# Patient Record
Sex: MALE | Race: WHITE | Employment: OTHER | URBAN - METROPOLITAN AREA
[De-identification: names, ages, dates, MRNs, and addresses within clinical notes are randomized per-mention and may not be internally consistent; named-entity substitution may affect disease eponyms.]

---

## 2022-05-07 ENCOUNTER — HOSPITAL ENCOUNTER (INPATIENT)
Facility: HOSPITAL | Age: 67
LOS: 4 days | Discharge: HOME | End: 2022-05-11
Attending: EMERGENCY MEDICINE | Admitting: HOSPITALIST
Payer: MEDICARE

## 2022-05-07 ENCOUNTER — APPOINTMENT (OUTPATIENT)
Dept: GENERAL RADIOLOGY | Facility: HOSPITAL | Age: 67
End: 2022-05-07
Attending: EMERGENCY MEDICINE
Payer: MEDICARE

## 2022-05-07 ENCOUNTER — APPOINTMENT (OUTPATIENT)
Dept: CT IMAGING | Facility: HOSPITAL | Age: 67
End: 2022-05-07
Attending: EMERGENCY MEDICINE
Payer: MEDICARE

## 2022-05-07 DIAGNOSIS — I10 HYPERTENSION, UNSPECIFIED TYPE: ICD-10-CM

## 2022-05-07 DIAGNOSIS — K56.609 SMALL BOWEL OBSTRUCTION (HCC): Primary | ICD-10-CM

## 2022-05-07 LAB
ALBUMIN SERPL-MCNC: 4.2 G/DL (ref 3.4–5)
ALP LIVER SERPL-CCNC: 60 U/L
ALT SERPL-CCNC: 19 U/L
ANION GAP SERPL CALC-SCNC: 9 MMOL/L (ref 0–18)
AST SERPL-CCNC: 20 U/L (ref 15–37)
BASOPHILS # BLD AUTO: 0.04 X10(3) UL (ref 0–0.2)
BASOPHILS NFR BLD AUTO: 0.4 %
BILIRUB DIRECT SERPL-MCNC: 0.2 MG/DL (ref 0–0.2)
BILIRUB SERPL-MCNC: 0.7 MG/DL (ref 0.1–2)
BILIRUB UR QL: NEGATIVE
BUN BLD-MCNC: 14 MG/DL (ref 7–18)
BUN/CREAT SERPL: 13.9 (ref 10–20)
CALCIUM BLD-MCNC: 9.5 MG/DL (ref 8.5–10.1)
CHLORIDE SERPL-SCNC: 104 MMOL/L (ref 98–112)
CLARITY UR: CLEAR
CO2 SERPL-SCNC: 26 MMOL/L (ref 21–32)
COLOR UR: YELLOW
CREAT BLD-MCNC: 1.01 MG/DL
DEPRECATED RDW RBC AUTO: 41.1 FL (ref 35.1–46.3)
EOSINOPHIL # BLD AUTO: 0.01 X10(3) UL (ref 0–0.7)
EOSINOPHIL NFR BLD AUTO: 0.1 %
ERYTHROCYTE [DISTWIDTH] IN BLOOD BY AUTOMATED COUNT: 12.5 % (ref 11–15)
GLUCOSE BLD-MCNC: 126 MG/DL (ref 70–99)
GLUCOSE UR-MCNC: NEGATIVE MG/DL
HCT VFR BLD AUTO: 40.9 %
HGB BLD-MCNC: 13.4 G/DL
HGB UR QL STRIP.AUTO: NEGATIVE
IMM GRANULOCYTES # BLD AUTO: 0.03 X10(3) UL (ref 0–1)
IMM GRANULOCYTES NFR BLD: 0.3 %
KETONES UR-MCNC: 20 MG/DL
LEUKOCYTE ESTERASE UR QL STRIP.AUTO: NEGATIVE
LYMPHOCYTES # BLD AUTO: 0.78 X10(3) UL (ref 1–4)
LYMPHOCYTES NFR BLD AUTO: 7.3 %
MAGNESIUM SERPL-MCNC: 1.6 MG/DL (ref 1.6–2.6)
MCH RBC QN AUTO: 29.3 PG (ref 26–34)
MCHC RBC AUTO-ENTMCNC: 32.8 G/DL (ref 31–37)
MCV RBC AUTO: 89.5 FL
MONOCYTES # BLD AUTO: 0.45 X10(3) UL (ref 0.1–1)
MONOCYTES NFR BLD AUTO: 4.2 %
NEUTROPHILS # BLD AUTO: 9.38 X10 (3) UL (ref 1.5–7.7)
NEUTROPHILS # BLD AUTO: 9.38 X10(3) UL (ref 1.5–7.7)
NEUTROPHILS NFR BLD AUTO: 87.7 %
NITRITE UR QL STRIP.AUTO: NEGATIVE
OSMOLALITY SERPL CALC.SUM OF ELEC: 290 MOSM/KG (ref 275–295)
PH UR: 7 [PH] (ref 5–8)
PLATELET # BLD AUTO: 321 10(3)UL (ref 150–450)
POTASSIUM SERPL-SCNC: 3.6 MMOL/L (ref 3.5–5.1)
PROT SERPL-MCNC: 8.5 G/DL (ref 6.4–8.2)
PROT UR-MCNC: 100 MG/DL
RBC # BLD AUTO: 4.57 X10(6)UL
SARS-COV-2 RNA RESP QL NAA+PROBE: NOT DETECTED
SODIUM SERPL-SCNC: 139 MMOL/L (ref 136–145)
SP GR UR STRIP: 1.02 (ref 1–1.03)
UROBILINOGEN UR STRIP-ACNC: <2
VIT C UR-MCNC: NEGATIVE MG/DL
WBC # BLD AUTO: 10.7 X10(3) UL (ref 4–11)

## 2022-05-07 PROCEDURE — 99222 1ST HOSP IP/OBS MODERATE 55: CPT | Performed by: HOSPITALIST

## 2022-05-07 PROCEDURE — 71045 X-RAY EXAM CHEST 1 VIEW: CPT | Performed by: EMERGENCY MEDICINE

## 2022-05-07 PROCEDURE — 74177 CT ABD & PELVIS W/CONTRAST: CPT | Performed by: EMERGENCY MEDICINE

## 2022-05-07 RX ORDER — HYDRALAZINE HYDROCHLORIDE 20 MG/ML
10 INJECTION INTRAMUSCULAR; INTRAVENOUS ONCE
Status: COMPLETED | OUTPATIENT
Start: 2022-05-07 | End: 2022-05-07

## 2022-05-07 RX ORDER — MORPHINE SULFATE 2 MG/ML
2 INJECTION, SOLUTION INTRAMUSCULAR; INTRAVENOUS EVERY 2 HOUR PRN
Status: DISCONTINUED | OUTPATIENT
Start: 2022-05-07 | End: 2022-05-11

## 2022-05-07 RX ORDER — MAGNESIUM SULFATE HEPTAHYDRATE 40 MG/ML
2 INJECTION, SOLUTION INTRAVENOUS ONCE
Status: COMPLETED | OUTPATIENT
Start: 2022-05-07 | End: 2022-05-08

## 2022-05-07 RX ORDER — LORAZEPAM 2 MG/ML
2 INJECTION INTRAMUSCULAR ONCE
Status: DISCONTINUED | OUTPATIENT
Start: 2022-05-07 | End: 2022-05-07

## 2022-05-07 RX ORDER — ONDANSETRON 2 MG/ML
4 INJECTION INTRAMUSCULAR; INTRAVENOUS EVERY 6 HOURS PRN
Status: DISCONTINUED | OUTPATIENT
Start: 2022-05-07 | End: 2022-05-11

## 2022-05-07 RX ORDER — MORPHINE SULFATE 4 MG/ML
4 INJECTION, SOLUTION INTRAMUSCULAR; INTRAVENOUS EVERY 2 HOUR PRN
Status: DISCONTINUED | OUTPATIENT
Start: 2022-05-07 | End: 2022-05-11

## 2022-05-07 RX ORDER — MORPHINE SULFATE 2 MG/ML
2 INJECTION, SOLUTION INTRAMUSCULAR; INTRAVENOUS ONCE
Status: COMPLETED | OUTPATIENT
Start: 2022-05-07 | End: 2022-05-07

## 2022-05-07 RX ORDER — LORAZEPAM 2 MG/ML
1 INJECTION INTRAMUSCULAR ONCE
Status: COMPLETED | OUTPATIENT
Start: 2022-05-07 | End: 2022-05-07

## 2022-05-07 RX ORDER — SODIUM CHLORIDE 9 MG/ML
INJECTION, SOLUTION INTRAVENOUS CONTINUOUS
Status: DISCONTINUED | OUTPATIENT
Start: 2022-05-07 | End: 2022-05-10

## 2022-05-07 RX ORDER — MORPHINE SULFATE 2 MG/ML
1 INJECTION, SOLUTION INTRAMUSCULAR; INTRAVENOUS EVERY 2 HOUR PRN
Status: DISCONTINUED | OUTPATIENT
Start: 2022-05-07 | End: 2022-05-11

## 2022-05-07 RX ORDER — MORPHINE SULFATE 4 MG/ML
4 INJECTION, SOLUTION INTRAMUSCULAR; INTRAVENOUS ONCE
Status: COMPLETED | OUTPATIENT
Start: 2022-05-07 | End: 2022-05-07

## 2022-05-07 NOTE — ED QUICK NOTES
Orders for admission, patient is aware of plan and ready to go upstairs. Any questions, please call ED RN jace at extension [phone].      Patient Covid vaccination status: Unvaccinated     COVID Test Ordered in ED: Rapid SARS-CoV-2 by PCR    COVID Suspicion at Admission: No risk with negative rapid    Running Infusions:  ns bolus    Mental Status/LOC at time of transport: a/ox4    Other pertinent information:  NG tube  CIWA score: N/A   NIH score:  N/A

## 2022-05-07 NOTE — ED INITIAL ASSESSMENT (HPI)
Right lower abdominal pain since this morning. No nausea. No chills/fever. Last drank pta.   Last ate at 10am.

## 2022-05-08 LAB
ANION GAP SERPL CALC-SCNC: 7 MMOL/L (ref 0–18)
BASOPHILS # BLD AUTO: 0.05 X10(3) UL (ref 0–0.2)
BASOPHILS NFR BLD AUTO: 0.6 %
BUN BLD-MCNC: 13 MG/DL (ref 7–18)
BUN/CREAT SERPL: 14 (ref 10–20)
CALCIUM BLD-MCNC: 8.6 MG/DL (ref 8.5–10.1)
CHLORIDE SERPL-SCNC: 105 MMOL/L (ref 98–112)
CO2 SERPL-SCNC: 26 MMOL/L (ref 21–32)
CREAT BLD-MCNC: 0.93 MG/DL
DEPRECATED RDW RBC AUTO: 42.8 FL (ref 35.1–46.3)
EOSINOPHIL # BLD AUTO: 0.13 X10(3) UL (ref 0–0.7)
EOSINOPHIL NFR BLD AUTO: 1.5 %
ERYTHROCYTE [DISTWIDTH] IN BLOOD BY AUTOMATED COUNT: 12.7 % (ref 11–15)
GLUCOSE BLD-MCNC: 109 MG/DL (ref 70–99)
HCT VFR BLD AUTO: 41.7 %
HGB BLD-MCNC: 13.6 G/DL
IMM GRANULOCYTES # BLD AUTO: 0.02 X10(3) UL (ref 0–1)
IMM GRANULOCYTES NFR BLD: 0.2 %
LYMPHOCYTES # BLD AUTO: 0.63 X10(3) UL (ref 1–4)
LYMPHOCYTES NFR BLD AUTO: 7.2 %
MAGNESIUM SERPL-MCNC: 2.5 MG/DL (ref 1.6–2.6)
MCH RBC QN AUTO: 29.8 PG (ref 26–34)
MCHC RBC AUTO-ENTMCNC: 32.6 G/DL (ref 31–37)
MCV RBC AUTO: 91.4 FL
MONOCYTES # BLD AUTO: 0.71 X10(3) UL (ref 0.1–1)
MONOCYTES NFR BLD AUTO: 8.1 %
NEUTROPHILS # BLD AUTO: 7.24 X10 (3) UL (ref 1.5–7.7)
NEUTROPHILS # BLD AUTO: 7.24 X10(3) UL (ref 1.5–7.7)
NEUTROPHILS NFR BLD AUTO: 82.4 %
OSMOLALITY SERPL CALC.SUM OF ELEC: 287 MOSM/KG (ref 275–295)
PLATELET # BLD AUTO: 301 10(3)UL (ref 150–450)
POTASSIUM SERPL-SCNC: 3.5 MMOL/L (ref 3.5–5.1)
RBC # BLD AUTO: 4.56 X10(6)UL
SODIUM SERPL-SCNC: 138 MMOL/L (ref 136–145)
WBC # BLD AUTO: 8.8 X10(3) UL (ref 4–11)

## 2022-05-08 PROCEDURE — 99223 1ST HOSP IP/OBS HIGH 75: CPT | Performed by: SURGERY

## 2022-05-08 PROCEDURE — 99233 SBSQ HOSP IP/OBS HIGH 50: CPT | Performed by: HOSPITALIST

## 2022-05-08 RX ORDER — HYDRALAZINE HYDROCHLORIDE 20 MG/ML
10 INJECTION INTRAMUSCULAR; INTRAVENOUS EVERY 4 HOURS PRN
Status: DISCONTINUED | OUTPATIENT
Start: 2022-05-08 | End: 2022-05-11

## 2022-05-08 RX ORDER — LABETALOL HYDROCHLORIDE 5 MG/ML
10 INJECTION, SOLUTION INTRAVENOUS EVERY 4 HOURS PRN
Status: DISCONTINUED | OUTPATIENT
Start: 2022-05-08 | End: 2022-05-11

## 2022-05-08 NOTE — ED QUICK NOTES
Dr. Philipp Marques aware of updated blood pressure. Patient okay to be admitted to floor with current blood pressure per MD. MD also aware of patient c/o worsening abdominal pain. Verbal order for morphine 2mg IVP stat.

## 2022-05-08 NOTE — PLAN OF CARE
Patient admitted to the ED with c/o RLQ abdominal pain, arrived on the unit with NG tube to R nare on LIS, patient is A/ox4 on RA, NPO, IVF infusing, replaced magnesium per non-cardiac electrolyte protocol, voiding freely, x1 episode of emesis, patient states he is able to tolerate pain. Vital signs are stable, no acute changes overnight. Patient orientated to unit, call light within reach, safety measures in place, frequent rounding done, will continue to monitor.

## 2022-05-09 PROCEDURE — 99233 SBSQ HOSP IP/OBS HIGH 50: CPT | Performed by: HOSPITALIST

## 2022-05-09 PROCEDURE — 99232 SBSQ HOSP IP/OBS MODERATE 35: CPT | Performed by: SURGERY

## 2022-05-09 NOTE — CM/SW NOTE
BPCI-Advanced Medicare Program Note:  Plan of care reviewed for care coordination and discharge planning. Noted patient falls under  BPCI/Medicare program, with  for gastrointestinal obstruction. NERY tool was used to help determine next care setting. Thus, [address] recommendations is for home pending patient progress. Case Management team is following for care coordination and discharge planning needs.

## 2022-05-10 LAB
ANION GAP SERPL CALC-SCNC: 7 MMOL/L (ref 0–18)
BASOPHILS # BLD AUTO: 0.05 X10(3) UL (ref 0–0.2)
BASOPHILS NFR BLD AUTO: 0.7 %
BUN BLD-MCNC: 18 MG/DL (ref 7–18)
BUN/CREAT SERPL: 22 (ref 10–20)
CALCIUM BLD-MCNC: 8.1 MG/DL (ref 8.5–10.1)
CHLORIDE SERPL-SCNC: 105 MMOL/L (ref 98–112)
CO2 SERPL-SCNC: 30 MMOL/L (ref 21–32)
CREAT BLD-MCNC: 0.82 MG/DL
DEPRECATED RDW RBC AUTO: 43 FL (ref 35.1–46.3)
EOSINOPHIL # BLD AUTO: 0.34 X10(3) UL (ref 0–0.7)
EOSINOPHIL NFR BLD AUTO: 4.9 %
ERYTHROCYTE [DISTWIDTH] IN BLOOD BY AUTOMATED COUNT: 12.4 % (ref 11–15)
GLUCOSE BLD-MCNC: 90 MG/DL (ref 70–99)
HCT VFR BLD AUTO: 36.4 %
HGB BLD-MCNC: 11.7 G/DL
IMM GRANULOCYTES # BLD AUTO: 0.02 X10(3) UL (ref 0–1)
IMM GRANULOCYTES NFR BLD: 0.3 %
LYMPHOCYTES # BLD AUTO: 1.1 X10(3) UL (ref 1–4)
LYMPHOCYTES NFR BLD AUTO: 15.9 %
MCH RBC QN AUTO: 30.1 PG (ref 26–34)
MCHC RBC AUTO-ENTMCNC: 32.1 G/DL (ref 31–37)
MCV RBC AUTO: 93.6 FL
MONOCYTES # BLD AUTO: 0.68 X10(3) UL (ref 0.1–1)
MONOCYTES NFR BLD AUTO: 9.8 %
NEUTROPHILS # BLD AUTO: 4.72 X10 (3) UL (ref 1.5–7.7)
NEUTROPHILS # BLD AUTO: 4.72 X10(3) UL (ref 1.5–7.7)
NEUTROPHILS NFR BLD AUTO: 68.4 %
OSMOLALITY SERPL CALC.SUM OF ELEC: 295 MOSM/KG (ref 275–295)
PLATELET # BLD AUTO: 224 10(3)UL (ref 150–450)
POTASSIUM SERPL-SCNC: 3.6 MMOL/L (ref 3.5–5.1)
RBC # BLD AUTO: 3.89 X10(6)UL
SODIUM SERPL-SCNC: 142 MMOL/L (ref 136–145)
WBC # BLD AUTO: 6.9 X10(3) UL (ref 4–11)

## 2022-05-10 PROCEDURE — 99232 SBSQ HOSP IP/OBS MODERATE 35: CPT | Performed by: SURGERY

## 2022-05-10 RX ORDER — LISINOPRIL 40 MG/1
40 TABLET ORAL DAILY
Qty: 30 TABLET | Refills: 0 | Status: SHIPPED | OUTPATIENT
Start: 2022-05-10

## 2022-05-10 NOTE — CDS QUERY
How to answer this Query:  1.) Click \"Edit button\" on the toolbar.  2.) Type an \"X\" in the bracket for the diagnosis that applies. (You may also add additional clinical details as you feel necessary to substantiate your response). 3.) Finally click \"Sign\" to complete response. Thank You  Potential for Impaired Nutritional Status  DOCUMENTATION CLARIFICATION FORM  Dear Johnnie Chang information suggests potential for impaired nutritional status. For accurate ICD-10-CM code assignment to reflect severity of illness and risk of mortality,  PLEASE (X) ALL DIAGNOSES THAT APPLY TO A BMI OF        SELECTION BY PHYSICIAN ONLY      (x)  Moderate Protein-Calorie Malnutrition    ( )  Protein-Calorie Malnutrition      ( )  Other (please specify):       ( )  Unable to Determine (please comment)         Documentation (include date/source): [id number]   _  Clinical Nutrition Assessment: RECOMMENDATIONS TO MD: Advance diet as medically safe     Pt is at moderate nutrition risk. Pt meets moderate malnutrition criteria. CRITERIA FOR MALNUTRITION DIAGNOSIS: Criteria for non-severe malnutrition diagnosis: chronic illness related to wt loss 7.5% in 3 months, body fat mild depletion and muscle mass mild depletion.    NUTRITION RELATED PHYSICAL FINDINGS:    - Nutrition Focused Physical Exam (NFPE): mild depletion body fat orbital region, triceps region and fat overlying rib cage region and mild depletion muscle mass temple region, clavicle region, scapular region, shoulder region, thigh region and calf region,    - Fluid Accumulation: none  see RN documentation for details  - Skin Integrity: intact see RN documentation for details     NUTRITION DIAGNOSIS/PROBLEM:    Moderate Malnutrition related to Chronic - Physiological causes increasing nutrient needs due to illness or condition in the setting of Altered GI function d/t SBO as evidenced by NPO/Cl liq diet, 0 intake, limited access to food PTA with 15% significant wt loss in the past several months ( ~ past 3 months per pt) and Low BMI of 17.2 kg/m2. Other:SEE FULL RD EVAL FOR FURTHER DETAILS    If you have any questions, please contact Clinical :  Leonardo Torres RN at [phone]     Thank You!     THIS FORM IS A PERMANENT PART OF THE MEDICAL RECORD

## 2022-05-10 NOTE — PLAN OF CARE
Patient had first solid meal this evening, and is tolerating well. No complaints of pain. He is ambulating in the hallway often, and is steady on his feet. Plan for discharge tomorrow.

## 2022-05-11 VITALS
HEIGHT: 69.6 IN | TEMPERATURE: 98 F | RESPIRATION RATE: 18 BRPM | SYSTOLIC BLOOD PRESSURE: 121 MMHG | DIASTOLIC BLOOD PRESSURE: 73 MMHG | WEIGHT: 119 LBS | BODY MASS INDEX: 17.23 KG/M2 | OXYGEN SATURATION: 98 % | HEART RATE: 67 BPM

## 2022-05-11 PROCEDURE — 99239 HOSP IP/OBS DSCHRG MGMT >30: CPT | Performed by: HOSPITALIST

## 2022-05-11 PROCEDURE — 99232 SBSQ HOSP IP/OBS MODERATE 35: CPT | Performed by: SURGERY

## 2022-05-11 NOTE — CM/SW NOTE
SW met with patient at bedside, patient is in agreement to a [address] PLAN: [address] to home address. PCS DONE. RN to call [phone] to schedule. Ride is on willcall for today.      SUNNY Godinez, [facility]    [id number]

## 2022-05-12 ENCOUNTER — PATIENT OUTREACH (OUTPATIENT)
Dept: CASE MANAGEMENT | Age: 67
End: 2022-05-12

## 2022-05-12 NOTE — PROGRESS NOTES
Attempted to reach the patient to complete a [facility]-Hospital FU call. The patient's phone rang multiple times without an answer or option for voicemail. NCM will try again later.

## 2022-05-12 NOTE — PROGRESS NOTES
NCM attempted to contact patient for hospital follow up. Line rings with no option to leave a message.

## 2022-06-28 ENCOUNTER — OFFICE VISIT (OUTPATIENT)
Dept: SURGERY | Facility: CLINIC | Age: 67
End: 2022-06-28
Payer: MEDICARE

## 2022-06-28 VITALS — BODY MASS INDEX: 17 KG/M2 | WEIGHT: 119 LBS

## 2022-06-28 DIAGNOSIS — K56.609 SBO (SMALL BOWEL OBSTRUCTION) (HCC): Primary | ICD-10-CM

## 2022-06-28 PROCEDURE — 1111F DSCHRG MED/CURRENT MED MERGE: CPT | Performed by: SURGERY

## 2022-06-28 PROCEDURE — 99214 OFFICE O/P EST MOD 30 MIN: CPT | Performed by: SURGERY

## 2022-09-13 ENCOUNTER — LAB ENCOUNTER (OUTPATIENT)
Dept: LAB | Facility: HOSPITAL | Age: 67
End: 2022-09-13
Attending: NURSE PRACTITIONER
Payer: MEDICARE

## 2022-09-13 ENCOUNTER — OFFICE VISIT (OUTPATIENT)
Dept: GASTROENTEROLOGY | Facility: CLINIC | Age: 67
End: 2022-09-13
Payer: MEDICARE

## 2022-09-13 ENCOUNTER — TELEPHONE (OUTPATIENT)
Dept: GASTROENTEROLOGY | Facility: CLINIC | Age: 67
End: 2022-09-13

## 2022-09-13 VITALS
BODY MASS INDEX: 17.37 KG/M2 | WEIGHT: 120 LBS | HEIGHT: 69.6 IN | SYSTOLIC BLOOD PRESSURE: 182 MMHG | DIASTOLIC BLOOD PRESSURE: 100 MMHG | HEART RATE: 103 BPM

## 2022-09-13 DIAGNOSIS — I10 PRIMARY HYPERTENSION: ICD-10-CM

## 2022-09-13 DIAGNOSIS — K56.609 SMALL BOWEL OBSTRUCTION (HCC): Primary | ICD-10-CM

## 2022-09-13 DIAGNOSIS — K56.609 SMALL BOWEL OBSTRUCTION (HCC): ICD-10-CM

## 2022-09-13 DIAGNOSIS — K59.00 CONSTIPATION, UNSPECIFIED CONSTIPATION TYPE: ICD-10-CM

## 2022-09-13 LAB
BASOPHILS # BLD AUTO: 0.06 X10(3) UL (ref 0–0.2)
BASOPHILS NFR BLD AUTO: 0.8 %
DEPRECATED RDW RBC AUTO: 42.5 FL (ref 35.1–46.3)
EOSINOPHIL # BLD AUTO: 0.04 X10(3) UL (ref 0–0.7)
EOSINOPHIL NFR BLD AUTO: 0.6 %
ERYTHROCYTE [DISTWIDTH] IN BLOOD BY AUTOMATED COUNT: 12.3 % (ref 11–15)
HCT VFR BLD AUTO: 41.2 %
HGB BLD-MCNC: 13.6 G/DL
IMM GRANULOCYTES # BLD AUTO: 0.02 X10(3) UL (ref 0–1)
IMM GRANULOCYTES NFR BLD: 0.3 %
LYMPHOCYTES # BLD AUTO: 1.07 X10(3) UL (ref 1–4)
LYMPHOCYTES NFR BLD AUTO: 15 %
MCH RBC QN AUTO: 30.8 PG (ref 26–34)
MCHC RBC AUTO-ENTMCNC: 33 G/DL (ref 31–37)
MCV RBC AUTO: 93.2 FL
MONOCYTES # BLD AUTO: 0.55 X10(3) UL (ref 0.1–1)
MONOCYTES NFR BLD AUTO: 7.7 %
NEUTROPHILS # BLD AUTO: 5.38 X10 (3) UL (ref 1.5–7.7)
NEUTROPHILS # BLD AUTO: 5.38 X10(3) UL (ref 1.5–7.7)
NEUTROPHILS NFR BLD AUTO: 75.6 %
PLATELET # BLD AUTO: 296 10(3)UL (ref 150–450)
RBC # BLD AUTO: 4.42 X10(6)UL
WBC # BLD AUTO: 7.1 X10(3) UL (ref 4–11)

## 2022-09-13 PROCEDURE — 99204 OFFICE O/P NEW MOD 45 MIN: CPT | Performed by: NURSE PRACTITIONER

## 2022-09-13 PROCEDURE — 36415 COLL VENOUS BLD VENIPUNCTURE: CPT

## 2022-09-13 PROCEDURE — 85025 COMPLETE CBC W/AUTO DIFF WBC: CPT

## 2022-09-13 RX ORDER — POLYETHYLENE GLYCOL 3350, SODIUM CHLORIDE, SODIUM BICARBONATE, POTASSIUM CHLORIDE 420; 11.2; 5.72; 1.48 G/4L; G/4L; G/4L; G/4L
POWDER, FOR SOLUTION ORAL
Qty: 4000 ML | Refills: 0 | Status: SHIPPED | OUTPATIENT
Start: 2022-09-13

## 2022-09-13 NOTE — TELEPHONE ENCOUNTER
Dr. Diya Shannon-    Patient was seen in office today with Ho oppe. She wanted him to have a Colonoscopy and Enteroscopy. I wanted to know will it be okay to add the patient to the ERCP time slots on Mondays. Also, what will be the length of time needed for the procedure? Please advise, thank you!

## 2022-09-13 NOTE — PATIENT INSTRUCTIONS
-miralax 1-2 capfuls daily  -squatty potty   -labs  -er if condition decline  -YENIFER Malone  Thursday 8  [employer]   2nd floor  -check blood pressure at home    1. Schedule colonoscopy with MAC w/ Dr. Mia Moore [Diagnosis: sbo]    2.  bowel prep from pharmacy (split trilyte)    3. Hold lisinopril am of procedure    4. Read all bowel prep instructions carefully    5. AVOID seeds, nuts, popcorn, raw fruits and vegetables (cooked is okay) for 2-3 days before procedure    6. You will need to be tested for COVID within 72 hours of your procedure. You will be contacted with instructions on how to do this.     >>>Please note: if you were prescribed Suprep for the bowel prep and it is too expensive or not covered by insurance, it is okay to substitute Trilyte (or any similar generic prep). This can be done by notifying the pharmacy or calling our office.

## 2022-09-14 NOTE — TELEPHONE ENCOUNTER
Spoke with doris on my thoughts on the patient of planning for colonoscopy and CT or MR enterography    Discussed I don't think a push enteroscopy will be more useful in this situation.      She will discuss with scheduling    Prefer that procedures are not scheduled in ERCP/EUS/interventional time slots    Thanks    Ran Hurtado MD  [facility], [facility] - Gastroenterology  9/14/2022  3:40 PM

## 2022-09-15 ENCOUNTER — OFFICE VISIT (OUTPATIENT)
Dept: INTERNAL MEDICINE CLINIC | Facility: CLINIC | Age: 67
End: 2022-09-15
Payer: MEDICARE

## 2022-09-15 VITALS
SYSTOLIC BLOOD PRESSURE: 175 MMHG | HEIGHT: 69.6 IN | WEIGHT: 121 LBS | BODY MASS INDEX: 17.52 KG/M2 | HEART RATE: 79 BPM | DIASTOLIC BLOOD PRESSURE: 91 MMHG

## 2022-09-15 DIAGNOSIS — I16.0 HYPERTENSIVE URGENCY: ICD-10-CM

## 2022-09-15 DIAGNOSIS — K56.609 SBO (SMALL BOWEL OBSTRUCTION) (HCC): Primary | ICD-10-CM

## 2022-09-15 DIAGNOSIS — I10 HYPERTENSION, UNSPECIFIED TYPE: ICD-10-CM

## 2022-09-15 PROCEDURE — 99214 OFFICE O/P EST MOD 30 MIN: CPT | Performed by: NURSE PRACTITIONER

## 2022-09-15 RX ORDER — LISINOPRIL 40 MG/1
40 TABLET ORAL DAILY
Qty: 30 TABLET | Refills: 0 | Status: SHIPPED | OUTPATIENT
Start: 2022-09-15

## 2022-09-15 NOTE — ASSESSMENT & PLAN NOTE
Blood pressure (!) 175/91, pulse 79, height 5' 9.6\" (1.768 m), weight 121 lb (54.9 kg). Blood pressure high. Did not know he was suppose to continue the blood pressure pills he was prescribed in the hospital.    Plan  Restart Lisinopril 40 mg. Follow up in two weeks.

## 2022-09-15 NOTE — ASSESSMENT & PLAN NOTE
Blood pressure (!) 175/91, pulse 79, height 5' 9.6\" (1.768 m), weight 121 lb (54.9 kg). Patient stopped taking his blood pressure meds when he rean out. Lisinopril 40mg po daily  Return in two weeks.

## 2022-09-16 NOTE — TELEPHONE ENCOUNTER
ZULAYM to schedule pt's procedure. Please transfer to Alesha Byers at ext [phone] for scheduling or ext [phone] on 9/16/22 only.

## 2022-09-16 NOTE — TELEPHONE ENCOUNTER
Scheduled for:  Colonoscopy - 49991  Provider Name:  Dr. Aliza Odell  Date:  9/28/22  Location:  [facility]  Sedation:  [facility]  Time:  Approx 10:15 am (pt is aware that [address] will call with arrival time)  Prep:  Trilyte, Prep instructions were given to pt over the phone, pt verbalized understanding. Meds/Allergies Reconciled?:  Yes, Physician reviewed    Diagnosis with codes:  SBO - K56.609  Was patient informed to call insurance with codes (Y/N):  Yes, I confirmed MEDICARE insurance with this patient. Referral sent?:  Yes, referral sent. [facility] or [address] notified?:  Yes, [facility] [employer]. Medication Orders:  Pt is to HOLD Lisinopril the morning of the procedure. Misc Orders:  Patient was informed that they will need a COVID 19 test prior to their procedure. Patient verbally understood & will await a phone call from [facility] to schedule. Further instructions given by staff:  I discussed the prep instructions with the patient which he verbally understood. Pt has ALL prep instructions from previous OV. I verbally went over prep instructions with pt as well.

## 2022-09-21 ENCOUNTER — HOSPITAL ENCOUNTER (OUTPATIENT)
Dept: CT IMAGING | Facility: HOSPITAL | Age: 67
Discharge: HOME | End: 2022-09-21
Attending: NURSE PRACTITIONER

## 2022-09-21 DIAGNOSIS — K59.00 CONSTIPATION, UNSPECIFIED CONSTIPATION TYPE: ICD-10-CM

## 2022-09-21 DIAGNOSIS — K56.609 SMALL BOWEL OBSTRUCTION (HCC): ICD-10-CM

## 2022-09-21 LAB
CREAT BLD-MCNC: 0.9 MG/DL
GFR SERPLBLD BASED ON 1.73 SQ M-ARVRAT: 93 ML/MIN/1.73M2 (ref 60–?)

## 2022-09-21 PROCEDURE — 74177 CT ABD & PELVIS W/CONTRAST: CPT | Performed by: NURSE PRACTITIONER

## 2022-09-21 PROCEDURE — 82565 ASSAY OF CREATININE: CPT

## 2022-09-22 ENCOUNTER — TELEPHONE (OUTPATIENT)
Dept: GASTROENTEROLOGY | Facility: CLINIC | Age: 67
End: 2022-09-22

## 2022-09-28 ENCOUNTER — SURGERY CENTER DOCUMENTATION (OUTPATIENT)
Dept: SURGERY | Age: 67
End: 2022-09-28

## 2022-09-28 ENCOUNTER — LAB REQUISITION (OUTPATIENT)
Dept: SURGERY | Age: 67
End: 2022-09-28
Payer: MEDICARE

## 2022-09-28 ENCOUNTER — MED REC SCAN ONLY (OUTPATIENT)
Dept: GASTROENTEROLOGY | Facility: CLINIC | Age: 67
End: 2022-09-28

## 2022-09-28 DIAGNOSIS — Z12.11 SPECIAL SCREENING FOR MALIGNANT NEOPLASMS, COLON: ICD-10-CM

## 2022-09-28 DIAGNOSIS — Z12.11 ENCOUNTER FOR COLORECTAL CANCER SCREENING: ICD-10-CM

## 2022-09-28 DIAGNOSIS — Z12.12 ENCOUNTER FOR COLORECTAL CANCER SCREENING: ICD-10-CM

## 2022-09-28 PROCEDURE — 45385 COLONOSCOPY W/LESION REMOVAL: CPT | Performed by: INTERNAL MEDICINE

## 2022-09-28 PROCEDURE — 45380 COLONOSCOPY AND BIOPSY: CPT | Performed by: INTERNAL MEDICINE

## 2022-09-28 PROCEDURE — 88305 TISSUE EXAM BY PATHOLOGIST: CPT | Performed by: INTERNAL MEDICINE

## 2022-09-29 ENCOUNTER — LAB ENCOUNTER (OUTPATIENT)
Dept: LAB | Age: 67
End: 2022-09-29
Attending: NURSE PRACTITIONER
Payer: MEDICARE

## 2022-09-29 ENCOUNTER — TELEPHONE (OUTPATIENT)
Dept: GASTROENTEROLOGY | Facility: CLINIC | Age: 67
End: 2022-09-29

## 2022-09-29 ENCOUNTER — OFFICE VISIT (OUTPATIENT)
Dept: INTERNAL MEDICINE CLINIC | Facility: CLINIC | Age: 67
End: 2022-09-29

## 2022-09-29 VITALS
HEART RATE: 62 BPM | WEIGHT: 122.19 LBS | SYSTOLIC BLOOD PRESSURE: 160 MMHG | HEIGHT: 69.6 IN | DIASTOLIC BLOOD PRESSURE: 80 MMHG | BODY MASS INDEX: 17.69 KG/M2

## 2022-09-29 DIAGNOSIS — R63.4 WEIGHT LOSS: ICD-10-CM

## 2022-09-29 DIAGNOSIS — I10 HYPERTENSION, UNSPECIFIED TYPE: ICD-10-CM

## 2022-09-29 DIAGNOSIS — I10 HYPERTENSION, UNSPECIFIED TYPE: Primary | ICD-10-CM

## 2022-09-29 LAB
T3 SERPL-MCNC: 95 NG/DL (ref 60–181)
TSI SER-ACNC: 3.48 MIU/ML (ref 0.36–3.74)

## 2022-09-29 PROCEDURE — 36415 COLL VENOUS BLD VENIPUNCTURE: CPT

## 2022-09-29 PROCEDURE — 84480 ASSAY TRIIODOTHYRONINE (T3): CPT

## 2022-09-29 PROCEDURE — 84443 ASSAY THYROID STIM HORMONE: CPT

## 2022-09-29 PROCEDURE — 99214 OFFICE O/P EST MOD 30 MIN: CPT | Performed by: NURSE PRACTITIONER

## 2022-09-29 RX ORDER — HYDROCHLOROTHIAZIDE 12.5 MG/1
12.5 TABLET ORAL DAILY
Qty: 90 TABLET | Refills: 1 | Status: SHIPPED | OUTPATIENT
Start: 2022-09-29

## 2022-09-29 RX ORDER — LISINOPRIL 40 MG/1
40 TABLET ORAL DAILY
Qty: 90 TABLET | Refills: 0 | Status: SHIPPED | OUTPATIENT
Start: 2022-09-29

## 2022-09-29 NOTE — TELEPHONE ENCOUNTER
Spoke with Fabircio w/ dilcia confirmation. Discussed cte results/aprn recommendations. Seen by sadi Barros today which results were also discussed at length with pcp input. Wrote down miralax instruction and will start implementing since being constipated does bother his hernia. Wears belt for inguinal hernia with management of irritation/discomfort but he'll consider surgery consult in future if becomes bothersome. Verbalized understanding and appreciative for reviewing cte findings.

## 2022-09-29 NOTE — ASSESSMENT & PLAN NOTE
Wt Readings from Last 6 Encounters:  09/29/22 : 122 lb 3.2 oz (55.4 kg)  09/15/22 : 121 lb (54.9 kg)  09/13/22 : 120 lb (54.4 kg)  06/28/22 : 119 lb (54 kg)  05/07/22 : 119 lb (54 kg)    Patient stated he use to be 140lbs. He gradually has put some weight back on since May- four lbs.     Plan  Will check thyroid function

## 2022-09-29 NOTE — ASSESSMENT & PLAN NOTE
Blood pressure 160/80, pulse 62, height 5' 9.6\" (1.768 m), weight 122 lb 3.2 oz (55.4 kg). Blood pressure remains high 160/80 taken in both arms. He did consume a pizza last night. Plan  Add hydrochlorothiazide 12.5mg po daily  Instructed to decrease cheese intake.   Return in three weeks to recheck B/P

## 2022-09-29 NOTE — TELEPHONE ENCOUNTER
Rasheeda Mabry MD  [facility] Clinical Staff  GI staff: please place recall for colonoscopy in 3 years     Results letter sent to patient via [address] and also printed and mailed out to patient. Patient outreach entered for 3 year colonoscopy recall. Done on 9/28/2022; due on 9/28/2025. Health maintenance updated to reflect completion and 3 year recall.

## 2022-10-20 ENCOUNTER — OFFICE VISIT (OUTPATIENT)
Dept: INTERNAL MEDICINE CLINIC | Facility: CLINIC | Age: 67
End: 2022-10-20
Payer: MEDICARE

## 2022-10-20 VITALS
DIASTOLIC BLOOD PRESSURE: 76 MMHG | BODY MASS INDEX: 17.55 KG/M2 | HEIGHT: 69.6 IN | WEIGHT: 121.19 LBS | SYSTOLIC BLOOD PRESSURE: 126 MMHG | HEART RATE: 71 BPM

## 2022-10-20 DIAGNOSIS — R63.4 WEIGHT LOSS: ICD-10-CM

## 2022-10-20 DIAGNOSIS — I10 HYPERTENSION, UNSPECIFIED TYPE: Primary | ICD-10-CM

## 2022-10-20 PROCEDURE — 99214 OFFICE O/P EST MOD 30 MIN: CPT | Performed by: NURSE PRACTITIONER

## 2022-10-20 NOTE — ASSESSMENT & PLAN NOTE
Weight loss is stable. Patient with appetite. Recent colonoscopy. Polyps removed. Return in 3 years 2025. Plan  Discussed lifestyle modifications including reductions in dietary total and saturated fat, weight loss, aerobic exercise, and eating a diet rich in fruits and vegetables.

## 2022-10-20 NOTE — ASSESSMENT & PLAN NOTE
Hypertension improved on hydrochlorothiazide. Blood pressure 126/76, pulse 71, height 5' 9.6\" (1.768 m), weight 121 lb 3.2 oz (55 kg). Plan  CMP on Nov. 20th.   Follow up appointment in Nov.

## 2022-11-21 ENCOUNTER — LAB ENCOUNTER (OUTPATIENT)
Dept: LAB | Age: 67
End: 2022-11-21
Attending: NURSE PRACTITIONER
Payer: MEDICARE

## 2022-11-21 DIAGNOSIS — I10 HYPERTENSION, UNSPECIFIED TYPE: ICD-10-CM

## 2022-11-21 LAB
ALBUMIN SERPL-MCNC: 3.8 G/DL (ref 3.4–5)
ALBUMIN/GLOB SERPL: 0.9 {RATIO} (ref 1–2)
ALP LIVER SERPL-CCNC: 69 U/L
ALT SERPL-CCNC: 20 U/L
ANION GAP SERPL CALC-SCNC: 4 MMOL/L (ref 0–18)
AST SERPL-CCNC: 17 U/L (ref 15–37)
BILIRUB SERPL-MCNC: 0.4 MG/DL (ref 0.1–2)
BUN BLD-MCNC: 19 MG/DL (ref 7–18)
BUN/CREAT SERPL: 22.4 (ref 10–20)
CALCIUM BLD-MCNC: 9.3 MG/DL (ref 8.5–10.1)
CHLORIDE SERPL-SCNC: 106 MMOL/L (ref 98–112)
CO2 SERPL-SCNC: 31 MMOL/L (ref 21–32)
CREAT BLD-MCNC: 0.85 MG/DL
FASTING STATUS PATIENT QL REPORTED: NO
GFR SERPLBLD BASED ON 1.73 SQ M-ARVRAT: 95 ML/MIN/1.73M2 (ref 60–?)
GLOBULIN PLAS-MCNC: 4.2 G/DL (ref 2.8–4.4)
GLUCOSE BLD-MCNC: 92 MG/DL (ref 70–99)
OSMOLALITY SERPL CALC.SUM OF ELEC: 294 MOSM/KG (ref 275–295)
POTASSIUM SERPL-SCNC: 4.9 MMOL/L (ref 3.5–5.1)
PROT SERPL-MCNC: 8 G/DL (ref 6.4–8.2)
SODIUM SERPL-SCNC: 141 MMOL/L (ref 136–145)

## 2022-11-21 PROCEDURE — 80053 COMPREHEN METABOLIC PANEL: CPT

## 2022-11-21 PROCEDURE — 36415 COLL VENOUS BLD VENIPUNCTURE: CPT

## 2022-11-28 ENCOUNTER — OFFICE VISIT (OUTPATIENT)
Dept: INTERNAL MEDICINE CLINIC | Facility: CLINIC | Age: 67
End: 2022-11-28
Payer: MEDICARE

## 2022-11-28 VITALS
BODY MASS INDEX: 17.64 KG/M2 | HEIGHT: 69.6 IN | HEART RATE: 74 BPM | DIASTOLIC BLOOD PRESSURE: 77 MMHG | WEIGHT: 121.81 LBS | SYSTOLIC BLOOD PRESSURE: 128 MMHG

## 2022-11-28 DIAGNOSIS — R63.4 WEIGHT LOSS: ICD-10-CM

## 2022-11-28 DIAGNOSIS — Z12.83 SCREENING FOR SKIN CANCER: Primary | ICD-10-CM

## 2022-11-28 DIAGNOSIS — D22.9 MULTIPLE NEVI: ICD-10-CM

## 2022-11-28 PROCEDURE — 99213 OFFICE O/P EST LOW 20 MIN: CPT | Performed by: NURSE PRACTITIONER

## 2022-11-28 NOTE — ASSESSMENT & PLAN NOTE
Weight loss is stable. Patient with appetite. Recent colonoscopy. Polyps removed. Return in 3 years 2025. Patient was 121 pounds and this was the same weight he was last month. Patient states he is eating well. He feels good. He would just like to put weight on. Plan  Patient instructed to continue to eat healthy meals. Increase fluid intake. Ensure one daily  Continue to weigh himself. Patient should add small weights to improve muscle mass. Patient should do yearly skin exam with derm.

## 2022-11-28 NOTE — ASSESSMENT & PLAN NOTE
Multiple nevi especially on his back. Patient should have dermatology evaluate. Plan  Derm Eval given to patient.

## 2023-03-28 RX ORDER — HYDROCHLOROTHIAZIDE 12.5 MG/1
TABLET ORAL
Qty: 90 TABLET | Refills: 3 | Status: SHIPPED | OUTPATIENT
Start: 2023-03-28

## 2023-03-28 NOTE — TELEPHONE ENCOUNTER
Refill passed per [employer], [facility] protocol    Requested Prescriptions   Pending Prescriptions Disp Refills    HYDROCHLOROTHIAZIDE 12.5 MG Oral Tab [Pharmacy Med Name: Hydrochlorothiazide 12.5 Mg Tab Acta] 90 tablet 3     Sig: TAKE ONE TABLET (12.5 MG TOTAL) BY MOUTH ONE TIME DAILY.        Hypertensive Medications Protocol Passed - 3/28/2023  9:17 AM        Passed - In person appointment in the past 12 or next 3 months     Recent Outpatient Visits              4 months ago Screening for skin cancer    Sai Bruce Lethaniel Hale, APRBRIGETTE    Office Visit    5 months ago Hypertension, unspecified type    [facility] Dilip Marquez, APRN    Office Visit    6 months ago Hypertension, unspecified type    [facility], [address] [facility] Sai Francisco, Dilip Marquez, APRN    Office Visit    6 months ago SBO (small bowel obstruction) [facility])    Sai Bruce Lethaniel Hale, CAROLYN    Office Visit    6 months ago Small bowel obstruction [facility])    [facility], [place] [address] [facility] E, APRN    Office Visit                      Passed - Last BP reading less than 140/90     BP Readings from Last 1 Encounters:  11/28/22 : 128/77              Passed - CMP or BMP in past 6 months     Recent Results (from the past 4392 hour(s))   COMP METABOLIC PANEL (14)    Collection Time: 11/21/22  3:02 PM   Result Value Ref Range    Glucose 92 70 - 99 mg/dL    Sodium 141 136 - 145 mmol/L    Potassium 4.9 3.5 - 5.1 mmol/L    Chloride 106 98 - 112 mmol/L    CO2 31.0 21.0 - 32.0 mmol/L    Anion Gap 4 0 - 18 mmol/L    BUN 19 (H) 7 - 18 mg/dL    Creatinine 0.85 0.70 - 1.30 mg/dL    BUN/CREA Ratio 22.4 (H) 10.0 - 20.0    Calcium, Total 9.3 8.5 - 10.1 mg/dL    Calculated Osmolality 294 275 - 295 mOsm/kg    eGFR-Cr 95 >=60 mL/min/1.73m2    ALT 20 16 - 61 U/L    AST 17 15 - 37 U/L    Alkaline Phosphatase 69 45 - 117 U/L    Bilirubin, Total 0.4 0.1 - 2.0 mg/dL    Total Protein 8.0 6.4 - 8.2 g/dL    Albumin 3.8 3.4 - 5.0 g/dL    Globulin  4.2 2.8 - 4.4 g/dL    A/G Ratio 0.9 (L) 1.0 - 2.0    Patient Fasting for CMP? No      *Note: Due to a large number of results and/or encounters for the requested time period, some results have not been displayed. A complete set of results can be found in Results Review.                Passed - In person appointment or virtual visit in the past 6 months     Recent Outpatient Visits              4 months ago [facility] for skin cancer    [facility]    Office Visit    5 months ago Hypertension, unspecified type    [facility] APR[facility]TE    Office Visit    6 months ago Hypertension, unspecified type    [facility] [facility]    Office Visit    6 months ago SBO (small bowel obstruction) [facility])    [facility] [facility]    Office Visit    6 months ago Small bowel obstruction [facility])    [facility], [address] [facility], [facility], APRN    Office Visit                      Passed - [facility] or GFRNAA > 50     GFR Evaluation  EGFRCR: 95 , resulted on 11/21/2022

## 2023-08-03 RX ORDER — LISINOPRIL 40 MG/1
40 TABLET ORAL DAILY
Qty: 90 TABLET | Refills: 0 | Status: SHIPPED | OUTPATIENT
Start: 2023-08-03

## 2023-08-04 NOTE — TELEPHONE ENCOUNTER
Please review. Protocol failed / No protocol. Requested Prescriptions   Pending Prescriptions Disp Refills    LISINOPRIL 40 MG Oral Tab [Pharmacy Med Name: Lisinopril 40 Mg Tab Lupi] 90 tablet 0     Sig: Take 1 tablet (40 mg total) by mouth daily. Hypertensive Medications Protocol Failed - 8/2/2023  6:28 PM        Failed - CMP or BMP in past 6 months     No results found for this or any previous visit (from the past 4392 hour(s)).             Failed - In person appointment or virtual visit in the past 6 months     Recent Outpatient Visits              8 months ago Screening for skin cancer    [facility]    Office Visit    9 months ago Hypertension, unspecified type    [address] [facility], Kendra Garcia Plump, APRN    Office Visit    10 months ago Hypertension, unspecified type    [address] [facility], Kendra Chanalaverne Plump, APRN    Office Visit    10 months ago SBO (small bowel obstruction) [facility])    [address] [facility], Kendra Garcia Plump, APRN    Office Visit    10 months ago Small bowel obstruction [facility])    [facility], [address] [facility], [place], Estle Bumpers, APRBRIGETTE    [employer] - In person appointment in the past 12 or next 3 months     Recent Outpatient Visits              8 months ago Screening for skin cancer    [facility]    Office Visit    9 months ago Hypertension, unspecified type    [address] [facility] Yre[place]annmarie Garcia Plump, APRN    Office Visit    10 months ago Hypertension, unspecified type    [facility], [address] [facility], [facility]N    Office Visit    10 months ago SBO (small bowel obstruction) [facility])    [address], [address] [facility], Cisco Root, APRN    Office Visit    10 months ago Small bowel obstruction [facility])    [facility], [facility], APRN    Office Visit                      Passed - Last BP reading less than 140/90     BP Readings from Last 1 Encounters:  11/28/22 : 128/77              Passed - EGFRCR or GFRNAA > 50     GFR Evaluation  EGFRCR: 95 , resulted on 11/21/2022               Recent Outpatient Visits              8 months ago Screening for skin cancer    Shaq Ramirez, APRN    Office Visit    9 months ago Hypertension, unspecified type    Kendra Ramirez, APRN    Office Visit    10 months ago Hypertension, unspecified type    Kendra Ramirez, APRN    Office Visit    10 months ago SBO (small bowel obstruction) [facility])    Kendra Ramirez, APRN    Office Visit    10 months ago Small bowel obstruction [facility])    Elvia Perez, [address] [facility] [place], Estle Bumpers, APRN    Office Visit

## 2023-11-13 ENCOUNTER — TELEPHONE (OUTPATIENT)
Dept: INTERNAL MEDICINE CLINIC | Facility: CLINIC | Age: 68
End: 2023-11-13

## 2023-11-15 RX ORDER — LISINOPRIL 40 MG/1
40 TABLET ORAL DAILY
Qty: 90 TABLET | Refills: 0 | Status: SHIPPED | OUTPATIENT
Start: 2023-11-15

## 2023-11-15 NOTE — TELEPHONE ENCOUNTER
Please review; protocol failed. No active /future labs noted   Message sent for patient to make an appointment. Requested Prescriptions   Pending Prescriptions Disp Refills    LISINOPRIL 40 MG Oral Tab [Pharmacy Med Name: Lisinopril 40 Mg Tab Lupi] 90 tablet 0     Sig: Take 1 tablet (40 mg total) by mouth daily. Hypertensive Medications Protocol Failed - 11/13/2023  7:16 PM        Failed - CMP or BMP in past 6 months     No results found for this or any previous visit (from the past 4392 hour(s)).             Failed - In person appointment or virtual visit in the past 6 months     Recent Outpatient Visits              11 months ago Screening for skin cancer    Shaq Sanchez, APRN    Office Visit    1 year ago Hypertension, unspecified type    [facility] [place], [place] Shanakrrylie Males, APRN    Office Visit    1 year ago Hypertension, unspecified type    Juanito [place], [place] Raadrylie Males, APRN    Office Visit    1 year ago SBO (small bowel obstruction) [facility])    [address], [address] [facility], [place] Ludrylie Males, APRN    Office Visit    1 year ago Small bowel obstruction [facility])    [address], [address] [facility] Andreas Horta, CAROLYN    [employer] - In person appointment in the past 12 or next 3 months     Recent Outpatient Visits              11 months ago Screening for skin cancer    Shaq Sanchez, APRN    Office Visit    1 year ago Hypertension, unspecified type    [place], Kendra Barrera Males, APRN    Office Visit    1 year ago Hypertension, unspecified type    [facility], [facility], APRN    Office Visit    1 year ago SBO (small bowel obstruction) ([facility], Madonna Gibbs, APRN    Office Visit    1 year ago Small bowel obstruction [facility])    [facility], [address] [facility], [facility] [address] APRN    Office Visit                      Passed - Last BP reading less than 140/90     BP Readings from Last 1 Encounters:   11/28/22 128/77               Passed - EGFRCR or GFRNAA > 50     GFR Evaluation  EGFRCR: 95 , resulted on 11/21/2022             Recent Outpatient Visits              11 months ago Screening for skin cancer    [facility]    Office Visit    1 year ago Hypertension, unspecified type    [facility]    Office Visit    1 year ago Hypertension, unspecified type    [facility] [facility], [facility]N    Office Visit    1 year ago SBO (small bowel obstruction) [facility])    [facility] [facility] Atoka[facility]marie Virgen, APRN    Office Visit    1 year ago Small bowel obstruction [facility])    Mansi Orozco, [address] [facility], [place], [address] APRN    Office Visit

## 2024-02-09 RX ORDER — LISINOPRIL 40 MG/1
40 TABLET ORAL DAILY
Qty: 90 TABLET | Refills: 0 | Status: SHIPPED | OUTPATIENT
Start: 2024-02-09

## 2024-02-09 NOTE — TELEPHONE ENCOUNTER
Please review. Protocol Failed or has No Protocol.    [employer] message sent to patient to schedule physical.    Requested Prescriptions   Pending Prescriptions Disp Refills    LISINOPRIL 40 MG Oral Tab [Pharmacy Med Name: Lisinopril 40 Mg Tab Lupi] 90 tablet 0     Sig: Take 1 tablet (40 mg total) by mouth daily.       Hypertension Medications Protocol Failed - 2/8/2024  1:30 AM        Failed - CMP or BMP in past 12 months        Failed - In person appointment or virtual visit in the past 12 mos or appointment in next 3 mos     Recent Outpatient Visits              1 year ago Screening for skin cancer    [facility]    Office Visit    1 year ago Hypertension, unspecified type    [facility], [facility]    Office Visit    1 year ago Hypertension, unspecified type    [facility], [facility], CAROLYN    Office Visit    1 year ago SBO (small bowel obstruction) (HCC)    [facility]    Office Visit    1 year ago Small bowel obstruction (HCC)    [facility], [facility] Ruby Moreira, CAROLYN    Office Visit                      Failed - EGFRCR or GFRNAA > 50     GFR Evaluation            Passed - Last BP reading less than 140/90     BP Readings from Last 1 Encounters:   11/28/22 128/77                    Recent Outpatient Visits              1 year ago Screening for skin cancer    [facility]    Office Visit    1 year ago Hypertension, unspecified type    [facility], [facility] APRBRIGETTE    Office Visit    1 year ago Hypertension, unspecified type    [facility] APRBRIGETTE    Office Visit    1 year ago SBO (small  bowel obstruction) ([facility])    [facility], [facility] [place], Priscilla Nolen APRN    Office Visit    1 year ago Small bowel obstruction ([facility])    [facility], [facility] [place] Ruby Moreira, CAROLYN    Office Visit

## 2024-06-19 ENCOUNTER — TELEPHONE (OUTPATIENT)
Dept: INTERNAL MEDICINE CLINIC | Facility: CLINIC | Age: 69
End: 2024-06-19

## 2024-06-21 NOTE — TELEPHONE ENCOUNTER
Please review; protocol failed/No Protocol    Last Office Visit: 11/28/2022  Will route to call center to call to schedule an appointment.     Requested Prescriptions   Pending Prescriptions Disp Refills    LISINOPRIL 40 MG Oral Tab [Pharmacy Med Name: Lisinopril 40 Mg Tab Lupi] 90 tablet 0     Sig: Take 1 tablet (40 mg total) by mouth daily.       Hypertension Medications Protocol Failed - 6/19/2024  7:02 PM        Failed - CMP or BMP in past 12 months        Failed - In person appointment or virtual visit in the past 12 mos or appointment in next 3 mos     Recent Outpatient Visits              1 year ago Screening for skin cancer    [facility]    Office Visit    1 year ago Hypertension, unspecified type    [facility]    Office Visit    1 year ago Hypertension, unspecified type    [facility], [facility] Priscilla Nolen APRN    Office Visit    1 year ago SBO [facility]small bowel obstruction) [facility]    Office Visit    1 year ago Small bowel obstruction (HCC)    [facility], [facility], [place] Ruby Moreira, CAROLYN    Office Visit                      Failed - EGFRCR or GFRNAA > 50     GFR Evaluation            Passed - Last BP reading less than 140/90     BP Readings from Last 1 Encounters:   11/28/22 128/77                    Recent Outpatient Visits              [facility]    [facility]    1 year ago Hypertension, unspecified type    [facility]    Office Visit    1 year ago Hypertension, unspecified type    [facility]     Office Visit    1 year ago SBO (small bowel obstruction) ([facility])    [facility], [facility], Priscilla Nolen APRN    Office Visit    1 year ago Small bowel obstruction ([facility])    [facility], [facility] [place] Ruby Moreira, CAROLYN    Office Visit

## 2024-06-22 RX ORDER — LISINOPRIL 40 MG/1
40 TABLET ORAL DAILY
Qty: 30 TABLET | Refills: 0 | OUTPATIENT
Start: 2024-06-22

## 2024-06-28 RX ORDER — LISINOPRIL 40 MG/1
40 TABLET ORAL DAILY
Qty: 90 TABLET | Refills: 0 | Status: SHIPPED | OUTPATIENT
Start: 2024-06-28 | End: 2024-07-01

## 2024-06-28 NOTE — TELEPHONE ENCOUNTER
Please review. Protocol Failed; No Protocol    Future Appointments   Date Time Provider Department Center   7/1/2024  1:00 PM Priscilla Joaquin APRN [facility]        Requested Prescriptions   Pending Prescriptions Disp Refills    LISINOPRIL 40 MG Oral Tab [Pharmacy Med Name: Lisinopril 40 Mg Tab Lupi] 90 tablet 0     Sig: Take 1 tablet (40 mg total) by mouth daily.       Hypertension Medications Protocol Failed - 6/25/2024 11:36 AM        Failed - CMP or BMP in past 12 months        Failed - In person appointment or virtual visit in the past 12 mos or appointment in next 3 mos     Recent Outpatient Visits              1 year ago Screening for skin cancer    [facility]    Office Visit    1 year ago Hypertension, unspecified type    [facility]    Office Visit    1 year ago Hypertension, unspecified type    [facility]aquin [facility]N    Office Visit    1 year ago SBO [facility]small bowel [facility]) [facility]    Office Visit    1 year ago Small bowel obstruction (HCC)    [facility], [facility], [place] Ruby Moreira APRN    Office Visit          Future Appointments         Provider Department Appt Notes    In 3 days [facility] , medication refill,  Policy advised  LOV 11/28/22                    Failed - EGFRCR or GFRNAA > 50     GFR Evaluation            Passed - Last BP reading less than 140/90     BP Readings from Last 1 Encounters:   11/28/22 128/77                      Future Appointments         Provider Department Appt Notes    In 3 days [facility] medication refill,  Policy advised  LOV 11/28/22           Recent Outpatient Visits              1 year ago Screening for skin cancer    [facility], [facility], Priscilla Nolen, CAROLYN    Office Visit    1 year ago Hypertension, unspecified type    [facility], [facility], Priscilla Nolen, CAROLYN    Office Visit    1 year ago Hypertension, unspecified type    [facility], [facility], Priscilla Nolen, CAROLYN    Office Visit    1 year ago SBO (small bowel obstruction) (HCC)    [facility], [facility], Priscilla Nolen, CAROLYN    Office Visit    1 year ago Small bowel obstruction (HCC)    [facility], [facility], Ruby Gudino, CAROLYN    Office Visit

## 2024-06-28 NOTE — TELEPHONE ENCOUNTER
Priscilla Joaquin, APRN   to Em Rn Triage         6/28/24  4:44 PM   Please shcedule an annual physical exam    Priscilla Joaquin DNP  Working with Dr.Abraham Saavedra message sent to patient to schedule an office visit with PCP.   Please make a phone attempt.

## 2024-06-30 PROBLEM — Z00.00 ANNUAL PHYSICAL EXAM: Status: ACTIVE | Noted: 2024-06-30

## 2024-07-01 ENCOUNTER — OFFICE VISIT (OUTPATIENT)
Dept: INTERNAL MEDICINE CLINIC | Facility: CLINIC | Age: 69
End: 2024-07-01
Payer: MEDICARE

## 2024-07-01 VITALS
WEIGHT: 120.63 LBS | BODY MASS INDEX: 17.47 KG/M2 | SYSTOLIC BLOOD PRESSURE: 150 MMHG | HEIGHT: 69.6 IN | HEART RATE: 65 BPM | DIASTOLIC BLOOD PRESSURE: 80 MMHG

## 2024-07-01 DIAGNOSIS — Z12.83 SCREENING FOR SKIN CANCER: ICD-10-CM

## 2024-07-01 DIAGNOSIS — E55.9 VITAMIN D DEFICIENCY: ICD-10-CM

## 2024-07-01 DIAGNOSIS — E53.9 VITAMIN B DEFICIENCY: ICD-10-CM

## 2024-07-01 DIAGNOSIS — K56.609 SBO (SMALL BOWEL OBSTRUCTION) (HCC): ICD-10-CM

## 2024-07-01 DIAGNOSIS — D22.9 MULTIPLE NEVI: ICD-10-CM

## 2024-07-01 DIAGNOSIS — I16.0 HYPERTENSIVE URGENCY: ICD-10-CM

## 2024-07-01 DIAGNOSIS — I50.40 COMBINED SYSTOLIC AND DIASTOLIC CONGESTIVE HEART FAILURE, UNSPECIFIED HF CHRONICITY (HCC): ICD-10-CM

## 2024-07-01 DIAGNOSIS — R63.4 WEIGHT LOSS: ICD-10-CM

## 2024-07-01 DIAGNOSIS — Z00.00 ANNUAL PHYSICAL EXAM: Primary | ICD-10-CM

## 2024-07-01 DIAGNOSIS — I10 HYPERTENSION, UNSPECIFIED TYPE: ICD-10-CM

## 2024-07-01 RX ORDER — LISINOPRIL 40 MG/1
40 TABLET ORAL DAILY
Qty: 90 TABLET | Refills: 0 | Status: SHIPPED | OUTPATIENT
Start: 2024-07-01

## 2024-07-01 RX ORDER — HYDROCHLOROTHIAZIDE 12.5 MG/1
12.5 TABLET ORAL DAILY
Qty: 90 TABLET | Refills: 3 | Status: SHIPPED | OUTPATIENT
Start: 2024-07-01

## 2024-07-01 NOTE — PROGRESS NOTES
Subjective:   Gasper Smith is a 68 year old male who presents for a Medicare Subsequent Annual Wellness visit (Pt already had Initial Annual Wellness) and scheduled follow up of multiple significant but stable problems.   68 year old male who is a patient of Dr. Kothari. I last saw him over two years ago.  Physical Exam  History/Other:   Fall Risk Assessment:   He has been screened for Falls and is low risk.      Cognitive Assessment:   He had a completely normal cognitive assessment - see flowsheet entries       Functional Ability/Status:   Gasper Smith has some abnormal functions as listed below:  He has Driving difficulties based on screening of functional status.       Depression Screening (PHQ-2/PHQ-9): PHQ-2 SCORE: 0  , done 7/1/2024        <5 minutes spent screening and counseling for depression    Advanced Directives:   He does NOT have a Living Will. [Do you have a living will?: No]  He does NOT have a Power of  for Health Care. [Do you have a healthcare power of ?: No]  Not discussed      Patient Active Problem List   Diagnosis    Hypertension, unspecified type    Hypertensive urgency    Weight loss    Multiple nevi    Annual physical exam     Allergies:  He is allergic to penicillins.    Current Medications:  Outpatient Medications Marked as Taking for the 7/1/24 encounter (Office Visit) with Priscilla Joaquin APRN   Medication Sig    lisinopril 40 MG Oral Tab Take 1 tablet (40 mg total) by mouth daily.    hydroCHLOROthiazide 12.5 MG Oral Tab Take 1 tablet (12.5 mg total) by mouth daily.    PEG 3350-KCl-Na Bicarb-NaCl (TRILYTE) 420 g Oral Recon Soln Take prep as directed by gastro office. May substitute with Trilyte/generic equivalent if needed.       Medical History:  He  has a past medical history of Colon adenomas (09/28/2022) and SBO (small bowel obstruction) (HCC) (05/07/2022).  Surgical History:  He  has a past surgical history that includes colonoscopy  (09/28/2022).   Family History:  His family history is not on file.  Social History:  He  reports that he has never smoked. He has never used smokeless tobacco. He reports current alcohol use. He reports that he does not use drugs.    Tobacco:  He has never smoked tobacco.    CAGE Alcohol Screen:   CAGE screening score of 0 on 7/1/2024, showing low risk of alcohol abuse.      Patient Care Team:  Last Kothari MD as PCP - General (Internal Medicine)    Review of Systems   Constitutional:  Negative for chills, fatigue and fever.   HENT:  Negative for congestion, ear pain, hearing loss, sinus pressure, sinus pain, sore throat, trouble swallowing and voice change.    Eyes:  Negative for pain and visual disturbance.   Respiratory:  Negative for cough, chest tightness and shortness of breath.    Cardiovascular:  Negative for chest pain, palpitations and leg swelling.   Gastrointestinal:  Negative for abdominal distention, abdominal pain, constipation, diarrhea, nausea and vomiting.   Endocrine: Positive for cold intolerance. Negative for heat intolerance.   Genitourinary:  Negative for dysuria and hematuria.   Musculoskeletal:  Negative for back pain and joint swelling.   Skin:  Negative for rash.   Allergic/Immunologic: Negative for environmental allergies and food allergies.   Neurological:  Negative for weakness, numbness and headaches.   Hematological:  Does not bruise/bleed easily.   Psychiatric/Behavioral:  Negative for dysphoric mood and sleep disturbance. The patient is nervous/anxious.      GENERAL: feels well otherwise  SKIN: denies any unusual skin lesions  EYES: denies blurred vision or double vision  HEENT: denies nasal congestion, sinus pain or ST  LUNGS: denies shortness of breath with exertion  CARDIOVASCULAR: denies chest pain on exertion  GI: denies abdominal pain, denies heartburn  : 1 per night nocturia, no complaint of urinary incontinence  MUSCULOSKELETAL: denies back pain  NEURO: denies  headaches  PSYCHE: denies depression or anxiety  HEMATOLOGIC: denies hx of anemia  ENDOCRINE: denies thyroid history  ALL/ASTHMA: denies hx of allergy or asthma    Objective:   Physical Exam  General Appearance:  Alert, cooperative, no distress, appears stated age   Head:  Normocephalic, without obvious abnormality, atraumatic   Eyes:  PERRL, conjunctiva/corneas clear, EOM's intact, both eyes   Ears:  Normal TM's and external ear canals, both ears   Nose: Nares normal, septum midline, mucosa normal, no drainage or sinus tenderness   Throat: Lips, mucosa, and tongue normal; teeth and gums normal   Neck: Supple, symmetrical, trachea midline, no adenopathy, thyroid: not enlarged, symmetric, no tenderness/mass/nodules, no carotid bruit or JVD   Back:   Symmetric, no curvature, ROM normal, no CVA tenderness   Lungs:   Clear to auscultation bilaterally, respirations unlabored   Chest Wall:  No tenderness or deformity   Heart:  Regular rate and rhythm, S1, S2 normal, no murmur, rub or gallop   Abdomen:   Soft, non-tender, bowel sounds active all four quadrants,  no masses, no organomegaly   Genitalia: Normal male   Rectal: Normal tone, normal prostate, no masses or tenderness   Extremities: Extremities normal, atraumatic, no cyanosis or edema   Pulses: 2+ and symmetric   Skin: Skin color, texture, turgor normal, no rashes or lesions   Lymph nodes: Cervical, supraclavicular, and axillary nodes normal   Neurologic: Normal     /80   Pulse 65   Ht 5' 9.6\" (1.768 m)   Wt 120 lb 9.6 oz (54.7 kg)   BMI 17.50 kg/m²  Estimated body mass index is 17.5 kg/m² as calculated from the following:    Height as of this encounter: 5' 9.6\" (1.768 m).    Weight as of this encounter: 120 lb 9.6 oz (54.7 kg).    Medicare Hearing Assessment:   Hearing Screening    Screening Method: Questionnaire  I have a problem hearing over the telephone: No I have trouble following the conversations when two or more people are talking at the same  time: No   I have trouble understanding things on the TV: No I have to strain to understand conversations: No   I have to worry about missing the telephone ring or doorbell: No I have trouble hearing conversations in a noisy background such as a crowded room or restaurant: No   I get confused about where sounds come from: No I misunderstand some words in a sentence and need to ask people to repeat themselves: No   I especially have trouble understanding the speech of women and children: No I have trouble understanding the speaker in a large room such as at a meeting or place of Bahai: No   Many people I talk to seem to mumble (or don't speak clearly): No People get annoyed because I misunderstand what they say: No   I misunderstand what others are saying and make inappropriate responses: No I avoid social activities because I cannot hear well and fear I will reply improperly: No   Family members and friends have told me they think I may have hearing loss: No             Visual Acuity:   Right Eye Visual Acuity: Uncorrected Right Eye Chart Acuity: 20/200   Left Eye Visual Acuity: Uncorrected Left Eye Chart Acuity: 20/100   Both Eyes Visual Acuity: Uncorrected Both Eyes Chart Acuity: 20/100   Able To Tolerate Visual Acuity: Yes        Assessment & Plan:   Gasper Smith is a 68 year old male who presents for a Medicare Assessment.     1. Annual physical exam (Primary)  Assessment & Plan:  Normal exam.  Labs as ordered.  Skin check normal.  Hernial orifices intact.  No cervical, inguinal, axillary lymphadenopathy.  Rectal exam completed-normal prostate, brown stools-guaiac negative.  PSA needs to be drawn.  Colonoscopy-Patient outreach entered for 3 year colonoscopy recall. Done on 9/28/2022; due on 9/28/2025.   Immunizations-    Prevnar 20 (PCV20) [[id number]]                I    Orders:  -     Comp Metabolic Panel (14); Future; Expected date: 06/30/2024  -     Lipid Panel; Future; Expected date: 06/30/2024  -      TSH W Reflex To Free T4; Future; Expected date: 06/30/2024  -     Vitamin D, 25-Hydroxy; Future; Expected date: 06/30/2024  -     Vitamin B12; Future; Expected date: 06/30/2024  -     PSA Total, Screen; Future; Expected date: 06/30/2024  -     CBC, Platelet; No Differential; Future; Expected date: 06/30/2024  2. Vitamin D deficiency  -     Vitamin D, 25-Hydroxy; Future; Expected date: 06/30/2024  3. Vitamin B deficiency  -     Vitamin B12; Future; Expected date: 06/30/2024  4. Weight loss  Assessment & Plan:  Wt Readings from Last 6 Encounters:   07/01/24 120 lb 9.6 oz (54.7 kg)   11/28/22 121 lb 12.8 oz (55.2 kg)   10/20/22 121 lb 3.2 oz (55 kg)   09/29/22 122 lb 3.2 oz (55.4 kg)   09/15/22 121 lb (54.9 kg)   09/13/22 120 lb (54.4 kg)      Weight has stayed steady for the past two years    Plan  Encouraged to use weights  5. SBO (small bowel obstruction) (HCC)  6. Multiple nevi  -     Derm Referral - In Network  7. Screening for skin cancer  8. Hypertensive urgency  Assessment & Plan:  Brought in readings from home    B/P systolic is 110-120 and diastolic 60- 82  9. Hypertension, unspecified type  Assessment & Plan:  Brought in B/P readings from home     B/P systolic is 110-120 and diastolic 60- 82       Plan  Follow up in 1 month  Buy Blood pressure machine  Obtain Labs- 12 hour fast      10. Combined systolic and diastolic congestive heart failure, unspecified HF chronicity (HCC)  Other orders  -     Lisinopril; Take 1 tablet (40 mg total) by mouth daily.  Dispense: 90 tablet; Refill: 0  -     hydroCHLOROthiazide; Take 1 tablet (12.5 mg total) by mouth daily.  Dispense: 90 tablet; Refill: 3  -     Prevnar 20 (PCV20) [[id number]]    The patient indicates understanding of these issues and agrees to the plan.  Reinforced healthy diet, lifestyle, and exercise.      No follow-ups on file.     CAROLYN Hart, 6/30/2024     Supplementary Documentation:   General Health:  In the past six months, have you lost more than  10 pounds without trying?: 3 - Don't know  Has your appetite been poor?: No  Type of Diet: Balanced  How does the patient maintain a good energy level?: Daily Walks;Other  How would you describe your daily physical activity?: Moderate  How would you describe your current health state?: Good  How do you maintain positive mental well-being?: Puzzles;Games  On a scale of 0 to 10, with 0 being no pain and 10 being severe pain, what is your pain level?: 0 - (None)  In the past six months, have you experienced urine leakage?: 0-No  At any time do you feel concerned for the safety/well-being of yourself and/or your children, in your home or elsewhere?: No  Have you had any immunizations at another office such as Influenza, Hepatitis B, Tetanus, or Pneumococcal?: No        Gasper Smith's SCREENING SCHEDULE   Tests on this list are recommended by your physician but may not be covered, or covered at this frequency, by your insurer.   Please check with your insurance carrier before scheduling to verify coverage.   PREVENTATIVE SERVICES FREQUENCY &  COVERAGE DETAILS LAST COMPLETION DATE   Diabetes Screening    Fasting Blood Sugar / Glucose    One screening every 12 months if never tested or if previously tested but not diagnosed with pre-diabetes   One screening every 6 months if diagnosed with pre-diabetes Lab Results   Component Value Date    GLU 92 11/21/2022        Cardiovascular Disease Screening    Lipid Panel  Cholesterol  Lipoprotein (HDL)  Triglycerides Covered every 5 years for all Medicare beneficiaries without apparent signs or symptoms of cardiovascular disease No results found for: \"CHOLEST\", \"HDL\", \"LDL\", \"LDLD\", \"LDLC\", \"TRIG\"      Electrocardiogram (EKG)   Covered if needed at Welcome to Medicare, and non-screening if indicated for medical reasons 05/07/2022      Ultrasound Screening for Abdominal Aortic Aneurysm (AAA) Covered once in a lifetime for one of the following risk factors    Men who are  65-75 years old and have ever smoked    Anyone with a family history -     Colorectal Cancer Screening  Covered for ages 50-85; only need ONE of the following:    Colonoscopy   Covered every 10 years    Covered every 2 years if patient is at high risk or previous colonoscopy was abnormal 09/28/2022    Health Maintenance   Topic Date Due    Colorectal Cancer Screening  09/28/2025       Flexible Sigmoidoscopy   Covered every 4 years -    Fecal Occult Blood Test Covered annually -   Prostate Cancer Screening    Prostate-Specific Antigen (PSA) Annually No results found for: \"PSA\"  Health Maintenance   Topic Date Due    PSA  Never done      Immunizations    Influenza Covered once per flu season  Please get every year -  No recommendations at this time    Pneumococcal Each vaccine (Rdgpubv68 & [id number]) covered once after 65 Prevnar 13: -    [id number]: -     Pneumococcal Vaccination(1 of 2 - PCV) Never done    Hepatitis B One screening covered for patients with certain risk factors   -  No recommendations at this time    Tetanus Toxoid Not covered by Medicare Part B unless medically necessary (cut with metal); may be covered with your pharmacy prescription benefits -    Tetanus, Diptheria and Pertusis TD and TDaP Not covered by Medicare Part B -  No recommendations at this time    Zoster Not covered by Medicare Part B; may be covered with your pharmacy  prescription benefits -  Zoster Vaccines(1 of 2) Never done     Annual Monitoring of Persistent Medications (ACE/ARB, digoxin diuretics, anticonvulsants)    Potassium Annually Lab Results   Component Value Date    K 4.9 11/21/2022         Creatinine   Annually Lab Results   Component Value Date    CREATSERUM 0.85 11/21/2022         BUN Annually Lab Results   Component Value Date    BUN 19 (H) 11/21/2022       Drug Serum Conc Annually No results found for: \"DIGOXIN\", \"DIG\", \"VALP\"

## 2024-07-01 NOTE — ASSESSMENT & PLAN NOTE
Brought in B/P readings from home     B/P systolic is 110-120 and diastolic 60- 82       Plan  Follow up in 1 month  Buy Blood pressure machine  Obtain Labs- 12 hour fast

## 2024-07-01 NOTE — ASSESSMENT & PLAN NOTE
Normal exam.  Labs as ordered.  Skin check normal.  Hernial orifices intact.  No cervical, inguinal, axillary lymphadenopathy.  Rectal exam completed-normal prostate, brown stools-guaiac negative.  PSA needs to be drawn.  Colonoscopy-Patient outreach entered for 3 year colonoscopy recall. Done on 9/28/2022; due on 9/28/2025.   Immunizations-    Prevnar 20 (PCV20) [19461]                I

## 2024-07-01 NOTE — ASSESSMENT & PLAN NOTE
Wt Readings from Last 6 Encounters:   07/01/24 120 lb 9.6 oz (54.7 kg)   11/28/22 121 lb 12.8 oz (55.2 kg)   10/20/22 121 lb 3.2 oz (55 kg)   09/29/22 122 lb 3.2 oz (55.4 kg)   09/15/22 121 lb (54.9 kg)   09/13/22 120 lb (54.4 kg)      Weight has stayed steady for the past two years    Plan  Encouraged to use weights

## 2024-07-02 ENCOUNTER — LAB ENCOUNTER (OUTPATIENT)
Dept: LAB | Age: 69
End: 2024-07-02
Attending: NURSE PRACTITIONER
Payer: MEDICARE

## 2024-07-02 ENCOUNTER — PATIENT MESSAGE (OUTPATIENT)
Dept: INTERNAL MEDICINE CLINIC | Facility: CLINIC | Age: 69
End: 2024-07-02

## 2024-07-02 DIAGNOSIS — Z00.00 ANNUAL PHYSICAL EXAM: ICD-10-CM

## 2024-07-02 DIAGNOSIS — E53.9 VITAMIN B DEFICIENCY: ICD-10-CM

## 2024-07-02 DIAGNOSIS — Z12.5 SCREENING FOR PROSTATE CANCER: Primary | ICD-10-CM

## 2024-07-02 DIAGNOSIS — E55.9 VITAMIN D DEFICIENCY: ICD-10-CM

## 2024-07-02 LAB
ALBUMIN SERPL-MCNC: 4.6 G/DL (ref 3.2–4.8)
ALBUMIN/GLOB SERPL: 1.4 {RATIO} (ref 1–2)
ALP LIVER SERPL-CCNC: 61 U/L
ALT SERPL-CCNC: 10 U/L
ANION GAP SERPL CALC-SCNC: 7 MMOL/L (ref 0–18)
AST SERPL-CCNC: 15 U/L (ref ?–34)
BILIRUB SERPL-MCNC: 0.4 MG/DL (ref 0.2–1.1)
BUN BLD-MCNC: 20 MG/DL (ref 9–23)
BUN/CREAT SERPL: 19.6 (ref 10–20)
CALCIUM BLD-MCNC: 10.1 MG/DL (ref 8.7–10.4)
CHLORIDE SERPL-SCNC: 108 MMOL/L (ref 98–112)
CHOLEST SERPL-MCNC: 239 MG/DL (ref ?–200)
CO2 SERPL-SCNC: 27 MMOL/L (ref 21–32)
CREAT BLD-MCNC: 1.02 MG/DL
DEPRECATED RDW RBC AUTO: 41.5 FL (ref 35.1–46.3)
EGFRCR SERPLBLD CKD-EPI 2021: 80 ML/MIN/1.73M2 (ref 60–?)
ERYTHROCYTE [DISTWIDTH] IN BLOOD BY AUTOMATED COUNT: 12.2 % (ref 11–15)
FASTING PATIENT LIPID ANSWER: YES
FASTING STATUS PATIENT QL REPORTED: YES
GLOBULIN PLAS-MCNC: 3.4 G/DL (ref 2–3.5)
GLUCOSE BLD-MCNC: 98 MG/DL (ref 70–99)
HCT VFR BLD AUTO: 39.1 %
HDLC SERPL-MCNC: 68 MG/DL (ref 40–59)
HGB BLD-MCNC: 12.9 G/DL
LDLC SERPL CALC-MCNC: 162 MG/DL (ref ?–100)
MCH RBC QN AUTO: 30.7 PG (ref 26–34)
MCHC RBC AUTO-ENTMCNC: 33 G/DL (ref 31–37)
MCV RBC AUTO: 93.1 FL
NONHDLC SERPL-MCNC: 171 MG/DL (ref ?–130)
OSMOLALITY SERPL CALC.SUM OF ELEC: 297 MOSM/KG (ref 275–295)
PLATELET # BLD AUTO: 249 10(3)UL (ref 150–450)
POTASSIUM SERPL-SCNC: 5 MMOL/L (ref 3.5–5.1)
PROT SERPL-MCNC: 8 G/DL (ref 5.7–8.2)
RBC # BLD AUTO: 4.2 X10(6)UL
SODIUM SERPL-SCNC: 142 MMOL/L (ref 136–145)
TRIGL SERPL-MCNC: 56 MG/DL (ref 30–149)
TSI SER-ACNC: 1.6 MIU/ML (ref 0.55–4.78)
VIT B12 SERPL-MCNC: 363 PG/ML (ref 211–911)
VIT D+METAB SERPL-MCNC: 19.2 NG/ML (ref 30–100)
VLDLC SERPL CALC-MCNC: 11 MG/DL (ref 0–30)
WBC # BLD AUTO: 6.1 X10(3) UL (ref 4–11)

## 2024-07-02 PROCEDURE — 82607 VITAMIN B-12: CPT

## 2024-07-02 PROCEDURE — 80061 LIPID PANEL: CPT

## 2024-07-02 PROCEDURE — 84443 ASSAY THYROID STIM HORMONE: CPT

## 2024-07-02 PROCEDURE — 85027 COMPLETE CBC AUTOMATED: CPT

## 2024-07-02 PROCEDURE — 36415 COLL VENOUS BLD VENIPUNCTURE: CPT

## 2024-07-02 PROCEDURE — 82306 VITAMIN D 25 HYDROXY: CPT

## 2024-07-02 PROCEDURE — 80053 COMPREHEN METABOLIC PANEL: CPT

## 2024-07-02 RX ORDER — LISINOPRIL 40 MG/1
40 TABLET ORAL DAILY
Qty: 90 TABLET | Refills: 0 | OUTPATIENT
Start: 2024-07-02

## 2024-07-03 ENCOUNTER — TELEPHONE (OUTPATIENT)
Dept: INTERNAL MEDICINE CLINIC | Facility: CLINIC | Age: 69
End: 2024-07-03

## 2024-07-03 NOTE — TELEPHONE ENCOUNTER
Please see mychart message from patient and my reply to him.  Priscilla, do you wish to use \"prostate cancer screening\" as a diagnosis?  New order pended for signature.

## 2024-07-03 NOTE — TELEPHONE ENCOUNTER
Asael April, RN 7/3/2024 9:02 AM CDT        ----- Message -----  From: Gasper Smith \"Fabricio\"  Sent: 7/2/2024 5:40 PM CDT  To: Irene Rn Triage  Subject: PSA, TOTAL SCREEN     Tuesday, 07/02/2024 - Dr. Joaquin: The phlebotomist indicated that the PSA Lab Test was flagged because Medicare may not pay for it. It is a $200.00 test. Under the coding used, it may not be allowed.   Fabricio Smith

## 2024-07-03 NOTE — TELEPHONE ENCOUNTER
Patient due for blood pressure follow-up and PSA. Patient last seen in office on 7/1/24, patient has scheduled follow-up appointment on 8/1/24. Labs completed yesterday except PSA, PSA  due. Care Gap letter generated and sent to patient via [employer] by [employer].

## 2024-07-05 ENCOUNTER — PATIENT MESSAGE (OUTPATIENT)
Dept: INTERNAL MEDICINE CLINIC | Facility: CLINIC | Age: 69
End: 2024-07-05

## 2024-07-05 NOTE — TELEPHONE ENCOUNTER
Priscilla, please see patient's follow up questions about Vitamin D supplement.    From: Gasper Smith  To: Priscilla Joaquin  Sent: 7/5/2024  2:44 PM CDT  Subject: Lab test result: Low vitamin D    07/05/2024 - Dr. Joaquin: With regard to \"Sunshine\" as a source of Vitamin D, warning on Hydrochlorothiazide label advises to avoid prolonged exposure to sun.       With regard to Vitamin D, 50,000 units seems like quite a large dose.  I read somewhere that some individuals had succumbed to Vitamin D toxicity after consuming shark's liver (very rich in Vitamin D).  Shouldn't we first try the appropriate food sources of Vitamin D in moderation for a time before resorting to a pharmaceutical solution? Or, perhaps, a lower dose for a longer period of time (i.e., 600 IU daily ([place] RDA for adults)?  I'm a bit concerned about the high dose.  Doesn't excessive Vitamin D cause hypercalcemia?  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                                                                                                                                                             Fabricio    P.S.: My BMI = 18.2 kg/m2 (Mild thinness or Underweight); Normal range = 18.5kg/m2 - 25kg/m2 for my height. Need to gain 1.7 lbs.

## 2024-07-05 NOTE — TELEPHONE ENCOUNTER
From  CAROLYN Hart To  Fabricio Smith Sent and Delivered  7/3/2024  6:25 PM   Last Read in MyChart  7/5/2024  2:49 PM by Gasper Smith

## 2024-07-08 ENCOUNTER — LAB ENCOUNTER (OUTPATIENT)
Dept: LAB | Age: 69
End: 2024-07-08
Attending: NURSE PRACTITIONER
Payer: MEDICARE

## 2024-07-08 DIAGNOSIS — Z00.00 ANNUAL PHYSICAL EXAM: ICD-10-CM

## 2024-07-08 DIAGNOSIS — Z12.5 SCREENING FOR PROSTATE CANCER: ICD-10-CM

## 2024-07-08 LAB — COMPLEXED PSA SERPL-MCNC: 8.41 NG/ML (ref ?–4)

## 2024-07-08 PROCEDURE — 36415 COLL VENOUS BLD VENIPUNCTURE: CPT

## 2024-07-08 NOTE — TELEPHONE ENCOUNTER
Please review-    Vitamin D3 2,000IU is over the counter, but patient called pharmacy requesting a prescription.    New prescription for Vitamin D3 2,000IU pended for review and approval  Requested Prescriptions     Pending Prescriptions Disp Refills    cholecalciferol 50 MCG (2000 UT) Oral Tab 90 tablet 3     Sig: Take 1 tablet (2,000 Units total) by mouth daily.

## 2024-07-08 NOTE — TELEPHONE ENCOUNTER
Pharmacist called stating that the patient contacted them requesting a new prescription for vitamin D. She stated they have never given this medication to the patient.

## 2024-07-09 ENCOUNTER — TELEPHONE (OUTPATIENT)
Dept: INTERNAL MEDICINE CLINIC | Facility: CLINIC | Age: 69
End: 2024-07-09

## 2024-07-09 DIAGNOSIS — Z12.83 SCREENING FOR SKIN CANCER: ICD-10-CM

## 2024-07-09 DIAGNOSIS — R97.20 ELEVATED PSA: Primary | ICD-10-CM

## 2024-07-09 RX ORDER — CHOLECALCIFEROL (VITAMIN D3) 125 MCG
2000 CAPSULE ORAL DAILY
Qty: 90 TABLET | Refills: 3 | Status: SHIPPED | OUTPATIENT
Start: 2024-07-09

## 2024-07-10 RX ORDER — FOLIC ACID 1 MG/1
50000 TABLET ORAL WEEKLY
Qty: 12 CAPSULE | Refills: 0 | Status: SHIPPED | OUTPATIENT
Start: 2024-07-10 | End: 2024-09-26

## 2024-07-18 ENCOUNTER — OFFICE VISIT (OUTPATIENT)
Dept: DERMATOLOGY CLINIC | Facility: CLINIC | Age: 69
End: 2024-07-18
Payer: MEDICARE

## 2024-07-18 DIAGNOSIS — L81.4 LENTIGINES: ICD-10-CM

## 2024-07-18 DIAGNOSIS — D22.9 MULTIPLE BENIGN NEVI: Primary | ICD-10-CM

## 2024-07-18 DIAGNOSIS — L82.1 SEBORRHEIC KERATOSES: ICD-10-CM

## 2024-07-18 PROCEDURE — 99203 OFFICE O/P NEW LOW 30 MIN: CPT | Performed by: STUDENT IN AN ORGANIZED HEALTH CARE EDUCATION/TRAINING PROGRAM

## 2024-07-18 NOTE — PROGRESS NOTES
July 18, 2024    New patient     CHIEF COMPLAINT: Multiple Nevi    HISTORY OF PRESENT ILLNESS: .    1. Multiple Nevi  Location: Back, sides of abdomen   Duration: Many yrs  Signs and symptoms: NONE  Current treatment: NONE      DERM HISTORY:  History of skin cancer: No  History of chronic skin disease/condition: No    FAMILY HISTORY:  History of melanoma: No  History of chronic skin disease/condition: No    History/Other:    REVIEW OF SYSTEMS:  Constitutional: Denies fever, chills, unintentional weight loss.   Skin as per HPI    PAST MEDICAL HISTORY:  Past Medical History:    Colon adenomas    x8    SBO (small bowel obstruction) (HCC)       Medications  Current Outpatient Medications   Medication Sig Dispense Refill    Cholecalciferol (VITAMIN D3) 1.25 MG (16761 UT) Oral Cap Take 50,000 Units by mouth once a week for 12 doses. For twelve weeks only 12 capsule 0    cholecalciferol 50 MCG (2000 UT) Oral Tab Take 1 tablet (2,000 Units total) by mouth daily. 90 tablet 3    lisinopril 40 MG Oral Tab Take 1 tablet (40 mg total) by mouth daily. 90 tablet 0    hydroCHLOROthiazide 12.5 MG Oral Tab Take 1 tablet (12.5 mg total) by mouth daily. 90 tablet 3    PEG 3350-KCl-Na Bicarb-NaCl (TRILYTE) 420 g Oral Recon Soln Take prep as directed by gastro office. May substitute with Trilyte/generic equivalent if needed. 4000 mL 0       Objective:    PHYSICAL EXAM:  General: awake, alert, no acute distress  Skin: Skin exam was performed today including the following: trunk and extremtiies. Pertinent findings include:   - With brown stuck on papules  - With regular brown appearing macules  - With stellate light brown macules  - With cherry red papules    ASSESSMENT & PLAN:  Pathophysiology of diagnoses discussed with patient.  Therapeutic options reviewed. Risks, benefits, and alternatives discussed with patient. Instructions reviewed at length.    #Lentigines  #Seborrheic keratoses   #Cherry angiomas   - Reassurance provided  regarding the benign nature of these lesions.  - Discussed that treatment considered cosmetic and not covered by insurance.     #Multiple benign nevi  - Complete skin exam performed today with no outlier lesions identified   - Reassured patient of benign nature of these lesions.   - Return for lesions that are growing, changing or symptomatic.   - Recommend daily photoprotection with broad-spectrum sunscreen, avoidance of sun during peak hours, and sun protective clothing.    - Dermoscopy was used for physical examination of pigmented lesions during today's office visit.    Return to clinic: 1 year or sooner if something concerning arises     Brendan Mohamud MD

## 2024-08-01 ENCOUNTER — OFFICE VISIT (OUTPATIENT)
Dept: INTERNAL MEDICINE CLINIC | Facility: CLINIC | Age: 69
End: 2024-08-01

## 2024-08-01 VITALS
DIASTOLIC BLOOD PRESSURE: 64 MMHG | BODY MASS INDEX: 17.11 KG/M2 | HEART RATE: 66 BPM | WEIGHT: 118.19 LBS | SYSTOLIC BLOOD PRESSURE: 120 MMHG | HEIGHT: 69.6 IN

## 2024-08-01 DIAGNOSIS — E78.2 MODERATE MIXED HYPERLIPIDEMIA NOT REQUIRING STATIN THERAPY: Primary | ICD-10-CM

## 2024-08-01 DIAGNOSIS — R97.20 ELEVATED PSA: ICD-10-CM

## 2024-08-01 DIAGNOSIS — E55.9 VITAMIN D DEFICIENCY: ICD-10-CM

## 2024-08-01 DIAGNOSIS — I10 HYPERTENSION, UNSPECIFIED TYPE: ICD-10-CM

## 2024-08-01 PROCEDURE — 99214 OFFICE O/P EST MOD 30 MIN: CPT | Performed by: NURSE PRACTITIONER

## 2024-08-01 NOTE — ASSESSMENT & PLAN NOTE
Component  Ref Range & Units 7/2/24 11:37 AM   Vitamin D, 25OH, Total  30.0 - 100.0 ng/mL 19.2 Low    Comment: Literature Recommendations for 25(OH)D levels are:  Range           Vitamin D Status   <20    ng/mL      Deficiency   20-<30 ng/mL      Insufficiency    ng/mL      Sufficiency   >100   ng/mL      Toxicity    *Clinical controversy exists regarding optimal 25(OH)D levels. Emerging evidence links potential adverse effects to high levels, particularly >60 ng/mL.          I sent  a script to his pharmacy for high dose Vitamin D 50,000 units.  Please take this ONCE A WEEK FOR 12 weeks. Then we should repeat the vitamin D level.

## 2024-08-01 NOTE — PROGRESS NOTES
HPI:    Patient ID: Gasper Smith is a 68 year old male.    HPI Follow up  68 year old male who I recently saw for a full physical  He had a elevated PSA and will be following with urology.  He has had some weight loss.  His appetite is good. He states he is following the DASH diet    Wt Readings from Last 6 Encounters:   08/01/24 118 lb 3.2 oz (53.6 kg)   07/01/24 120 lb 9.6 oz (54.7 kg)   11/28/22 121 lb 12.8 oz (55.2 kg)   10/20/22 121 lb 3.2 oz (55 kg)   09/29/22 122 lb 3.2 oz (55.4 kg)   09/15/22 121 lb (54.9 kg)      Immunization History   Administered Date(s) Administered    None   Pended Date(s) Pended    Pneumococcal Conjugate PCV20 07/01/2024       Past Medical History:    Colon adenomas    x8    SBO (small bowel obstruction) (HCC)      Past Surgical History:   Procedure Laterality Date    Colonoscopy  09/28/2022      Social History     Socioeconomic History    Marital status: Single   Tobacco Use    Smoking status: Never    Smokeless tobacco: Never   Vaping Use    Vaping status: Never Used   Substance and Sexual Activity    Alcohol use: Yes    Drug use: Never   Other Topics Concern    Reaction to local anesthetic No    Pt has a pacemaker No    Pt has a defibrillator No          Review of Systems   Constitutional:  Negative for chills, fatigue and fever.   HENT:  Negative for ear pain, hearing loss, sinus pain, sore throat and trouble swallowing.    Eyes:  Negative for pain and visual disturbance.   Respiratory:  Negative for cough, chest tightness and shortness of breath.    Cardiovascular:  Negative for chest pain, palpitations and leg swelling.   Gastrointestinal:  Negative for abdominal pain, constipation, diarrhea, nausea and vomiting.   Endocrine: Negative for cold intolerance and heat intolerance.   Genitourinary:  Negative for dysuria and hematuria.   Musculoskeletal:  Negative for back pain and joint swelling.   Skin:  Negative for rash.   Allergic/Immunologic: Negative for environmental  allergies.   Neurological:  Negative for weakness, numbness and headaches.   Hematological:  Does not bruise/bleed easily.   Psychiatric/Behavioral:  Negative for dysphoric mood and sleep disturbance. The patient is not nervous/anxious.               Current Outpatient Medications   Medication Sig Dispense Refill    Cholecalciferol (VITAMIN D3) 1.25 MG (60570 UT) Oral Cap Take 50,000 Units by mouth once a week for 12 doses. For twelve weeks only 12 capsule 0    lisinopril 40 MG Oral Tab Take 1 tablet (40 mg total) by mouth daily. 90 tablet 0    hydroCHLOROthiazide 12.5 MG Oral Tab Take 1 tablet (12.5 mg total) by mouth daily. 90 tablet 3     Allergies:  Allergies   Allergen Reactions    Penicillins UNKNOWN      PHYSICAL EXAM:   Physical Exam  Constitutional:       Appearance: Normal appearance.   HENT:      Head: Normocephalic.      Mouth/Throat:      Mouth: Mucous membranes are moist.   Pulmonary:      Effort: Pulmonary effort is normal.   Musculoskeletal:         General: Normal range of motion.   Skin:     General: Skin is dry.   Neurological:      Mental Status: He is alert and oriented to person, place, and time.   Psychiatric:         Mood and Affect: Mood normal.         Behavior: Behavior normal.         Thought Content: Thought content normal.         Judgment: Judgment normal.       /64   Pulse 66   Ht 5' 9.6\" (1.768 m)   Wt 118 lb 3.2 oz (53.6 kg)   BMI 17.16 kg/m²   Wt Readings from Last 2 Encounters:   08/01/24 118 lb 3.2 oz (53.6 kg)   07/01/24 120 lb 9.6 oz (54.7 kg)     Body mass index is 17.16 kg/m².(2)  Lab Results   Component Value Date    WBC 6.1 07/02/2024    RBC 4.20 07/02/2024    HGB 12.9 (L) 07/02/2024    HCT 39.1 07/02/2024    MCV 93.1 07/02/2024    MCH 30.7 07/02/2024    MCHC 33.0 07/02/2024    RDW 12.2 07/02/2024    .0 07/02/2024      Lab Results   Component Value Date    GLU 98 07/02/2024    BUN 20 07/02/2024    BUNCREA 19.6 07/02/2024    CREATSERUM 1.02 07/02/2024     ANIONGAP 7 07/02/2024    GFRNAA 92 05/10/2022    GFRAA 106 05/10/2022    CA 10.1 07/02/2024    OSMOCALC 297 (H) 07/02/2024    ALKPHO 61 07/02/2024    AST 15 07/02/2024    ALT 10 07/02/2024    BILT 0.4 07/02/2024    TP 8.0 07/02/2024    ALB 4.6 07/02/2024    GLOBULIN 3.4 07/02/2024     07/02/2024    K 5.0 07/02/2024     07/02/2024    CO2 27.0 07/02/2024      No results found for: \"EAG\", \"A1C\"   Lab Results   Component Value Date    CHOLEST 239 (H) 07/02/2024    TRIG 56 07/02/2024    HDL 68 (H) 07/02/2024     (H) 07/02/2024    VLDL 11 07/02/2024    NONHDLC 171 (H) 07/02/2024      Lab Results   Component Value Date    TSH 1.605 07/02/2024                ASSESSMENT/PLAN:     Problem List Items Addressed This Visit       Vitamin D deficiency         Component  Ref Range & Units 7/2/24 11:37 AM   Vitamin D, 25OH, Total  30.0 - 100.0 ng/mL 19.2 Low    Comment: Literature Recommendations for 25(OH)D levels are:  Range           Vitamin D Status   <20    ng/mL      Deficiency   20-<30 ng/mL      Insufficiency    ng/mL      Sufficiency   >100   ng/mL      Toxicity    *Clinical controversy exists regarding optimal 25(OH)D levels. Emerging evidence links potential adverse effects to high levels, particularly >60 ng/mL.          I sent  a script to his pharmacy for high dose Vitamin D 50,000 units.  Please take this ONCE A WEEK FOR 12 weeks. Then we should repeat the vitamin D level.          Hypertension, unspecified type     Blood pressure 120/64, pulse 66, height 5' 9.6\" (1.768 m), weight 118 lb 3.2 oz (53.6 kg).     lisinopril 40 MG Oral Tab Take 1 tablet (40 mg total) by mouth daily. 90 tablet 0   hydroCHLOROthiazide 12.5 MG Oral Tab Take 1 tablet (12.5 mg total) by mouth daily. 90 tablet 3            Elevated PSA     Elevated PSA    Component  Ref Range & Units 7/8/24  2:35 PM   Prostate Specific Antigen Screen  <=4.00 ng/mL 8.41 High        Plan  Encouraged to make an appointment with   Wil            Other Visit Diagnoses       Moderate mixed hyperlipidemia not requiring statin therapy    -  Primary    Relevant Orders    Lipid Panel [E]               Orders Placed This Encounter   Procedures    Lipid Panel [E]       Meds This Visit:  Requested Prescriptions      No prescriptions requested or ordered in this encounter       Imaging & Referrals:  None         CAROLYN Hart

## 2024-08-01 NOTE — ASSESSMENT & PLAN NOTE
Blood pressure 120/64, pulse 66, height 5' 9.6\" (1.768 m), weight 118 lb 3.2 oz (53.6 kg).     lisinopril 40 MG Oral Tab Take 1 tablet (40 mg total) by mouth daily. 90 tablet 0   hydroCHLOROthiazide 12.5 MG Oral Tab Take 1 tablet (12.5 mg total) by mouth daily. 90 tablet 3

## 2024-08-01 NOTE — ASSESSMENT & PLAN NOTE
Elevated PSA    Component  Ref Range & Units 7/8/24  2:35 PM   Prostate Specific Antigen Screen  <=4.00 ng/mL 8.41 High        Plan  Encouraged to make an appointment with Dr. De La Torre

## 2025-01-03 RX ORDER — LISINOPRIL 40 MG/1
40 TABLET ORAL DAILY
Qty: 90 TABLET | Refills: 3 | Status: SHIPPED | OUTPATIENT
Start: 2025-01-03

## 2025-01-04 NOTE — TELEPHONE ENCOUNTER
Refill passed per [facility] protocol.   Requested Prescriptions   Pending Prescriptions Disp Refills    LISINOPRIL 40 MG Oral Tab [Pharmacy Med Name: Lisinopril 40 Mg Tab Lupi] 90 tablet 0     Sig: Take 1 tablet (40 mg total) by mouth daily.       Hypertension Medications Protocol Passed - 1/3/2025  9:37 PM        Passed - CMP or BMP in past 12 months        Passed - Last BP reading less than 140/90     BP Readings from Last 1 Encounters:   08/01/24 120/64               Passed - In person appointment or virtual visit in the past 12 mos or appointment in next 3 mos     Recent Outpatient Visits              5 months ago Moderate mixed hyperlipidemia not requiring statin therapy    [facility] Priscilla Joaquin APRN    Office Visit    5 months ago Multiple benign nevi    [facility] Raymond[facility] Sultana MD    Office Visit    6 months ago Annual physical exam    [facility] Priest APRN    Office Visit    2 years ago Screening for skin cancer    [email or web address] Priest APRN    Office Visit    2 years ago Hypertension, unspecified type    [facility] Priest APRN    Office Visit          Future Appointments         Provider Department Appt Notes    In 1 month Priscilla Joaquin APRN [facility] [email or web address] FU - next appt AWV                    Passed - EGFRCR or GFRNAA > 50     GFR Evaluation  EGFRCR: 80 , resulted on 7/2/2024

## 2025-01-28 ENCOUNTER — LAB ENCOUNTER (OUTPATIENT)
Dept: LAB | Age: 70
End: 2025-01-28
Attending: NURSE PRACTITIONER
Payer: MEDICARE

## 2025-01-28 DIAGNOSIS — E78.2 MODERATE MIXED HYPERLIPIDEMIA NOT REQUIRING STATIN THERAPY: ICD-10-CM

## 2025-01-28 LAB
CHOLEST SERPL-MCNC: 250 MG/DL (ref ?–200)
FASTING PATIENT LIPID ANSWER: YES
HDLC SERPL-MCNC: 63 MG/DL (ref 40–59)
LDLC SERPL CALC-MCNC: 172 MG/DL (ref ?–100)
NONHDLC SERPL-MCNC: 187 MG/DL (ref ?–130)
TRIGL SERPL-MCNC: 90 MG/DL (ref 30–149)
VLDLC SERPL CALC-MCNC: 18 MG/DL (ref 0–30)

## 2025-01-28 PROCEDURE — 36415 COLL VENOUS BLD VENIPUNCTURE: CPT

## 2025-01-28 PROCEDURE — 80061 LIPID PANEL: CPT

## 2025-06-26 ENCOUNTER — TELEPHONE (OUTPATIENT)
Dept: GASTROENTEROLOGY | Facility: CLINIC | Age: 70
End: 2025-06-26

## 2025-07-21 ENCOUNTER — TELEPHONE (OUTPATIENT)
Dept: INTERNAL MEDICINE CLINIC | Facility: CLINIC | Age: 70
End: 2025-07-21

## 2025-07-21 RX ORDER — HYDROCHLOROTHIAZIDE 12.5 MG/1
12.5 TABLET ORAL DAILY
Qty: 90 TABLET | Refills: 0 | Status: SHIPPED | OUTPATIENT
Start: 2025-07-21

## 2025-07-21 NOTE — TELEPHONE ENCOUNTER
First outreach attempt of the year. Patient due for their Annual Medicare Physical exam 8/1/25 and Colonoscopy 9/28/25. Patient last seen in office on 8/1/24, no future appointments scheduled. [email or web address] message and physical letter generated and sent to patient via [employer] and [employer] mail delivery.

## (undated) NOTE — LETTER
Chet Regalado,     This is the [facility], office of Dr. Last Kothari    Thank you for putting your trust in [facility].  Our goal is to deliver the highest quality healthcare and an exceptional patient experience. Upon reviewing of your medical record shows you are due for the following:     r   Blood pressure follow up   PSA        Please call [phone] to schedule your appointment or schedule online via [employer].     If you changed to a new provider at another facility, please notify the clinic to update your records.    If you had any recent testing at another facility, please have your results faxed to our office at [phone].           Thank you and have a great day!

## (undated) NOTE — LETTER
Chet Regalado,      This is the [facility], office of Dr. Last Kothari     Thank you for putting your trust in [facility].  Our goal is to deliver the highest quality healthcare and an exceptional patient experience. Review of your medical record shows you are due for the following:        Annual Medicare Physical on 8/1/25  Colonoscopy on 9/28/25            Please call [phone] to schedule your appointment or schedule online via [employer].     If you changed to a new provider at another facility, please notify the clinic to update your records.     If you had any recent testing at another facility, please have your results faxed to our office at [phone].      Thank you and have a great day!

## (undated) NOTE — IP AVS SNAPSHOT
[facility]            (For Outpatient Use Only) Initial Admit Date: 2022   Inpt/Obs Admit Date: Inpt: 22 / Obs: N/A   Discharge Date:    Ana Cornea:  [de-identified]   MRN: [de-identified]   CSN: [id number]   CEID: [id number]        ENCOUNTER  Patient Class: Inpatient Admitting Provider: Campbell Melendez MD Unit: [address] [place] [address]/SW/SE   Hospital Service: Surgical Attending Provider: Marlena Malone MD   Bed: 461-A   Visit Type:   Referring Physician: No ref. provider found Billing Flag:    Admit Diagnosis: Small bowel obstruction [facility]) [I68.367]      PATIENT  Legal Name:   Anton Levine   Legal Sex: Male  Gender ID:              [address] [facility] Name:    PCP:  Pcp, Unknown Home: [phone]   Address:  [place] [address] : 1955 (66 yrs) Mobile: [phone]         City/State/[facility]: [address] [address] Marital: Single Language: Frances estrella: Bryan SSN4: [id number] Yazidism: [address] Not L*     Race: White Ethnicity: Non  Or [address] [place] [address] Road CONTACT   Name Relationship Legal Guardian? Home Phone Work Phone Mobile Phone   1. Aydee Karley  2. *No Contact Specified* Friend            [phone]       GUARANTOR  Guarantor: Juan Appiah : 1955 Home Phone: [phone]   Address: [address]  [place] [address]  Sex:  Male Work Phone:    [facility] Georgi Newman   Rel. to Patient: Self Guarantor ID: [id number]   [address]   Employer:  Status: RETIRED     COVERAGE  PRIMARY INSURANCE   Payor: MEDICARE Plan: MEDICARE PART A&B   Group Number:  Insurance Type: INDEMNITY   Subscriber Name: Jesse Ruibo : 1955   Subscriber ID: [id number] Pt Rel to Subscriber: Self   SECONDARY INSURANCE   Payor:  Plan:    Group Number:  Insurance Type:    Subscriber Name:  Subscriber :    Subscriber ID:  Pt Rel to Subscriber:    TERTIARY INSURANCE   Payor:  Plan:    Group Number:  Insurance Type:    Subscriber Name:  Subscriber :    Subscriber ID:  Pt Rel to Subscriber:    Hospital Account Financial Class: Medicare    May 11, 2022

## (undated) NOTE — Clinical Note
Hey,  This is the lio I texted you about. I had Ligia schedule him for cln/enteroscopy for 90 min. If you don't think you can reach the area with enteroscopy, would you recommend cte or vce vs referral to tertiary for balloon enteroscopy? Let me know. Thanks!